# Patient Record
Sex: FEMALE | NOT HISPANIC OR LATINO | Employment: FULL TIME | URBAN - METROPOLITAN AREA
[De-identification: names, ages, dates, MRNs, and addresses within clinical notes are randomized per-mention and may not be internally consistent; named-entity substitution may affect disease eponyms.]

---

## 2019-09-17 ENCOUNTER — TRANSCRIBE ORDERS (OUTPATIENT)
Dept: LAB | Facility: HOSPITAL | Age: 44
End: 2019-09-17

## 2019-09-17 ENCOUNTER — LAB (OUTPATIENT)
Dept: LAB | Facility: HOSPITAL | Age: 44
End: 2019-09-17

## 2019-09-17 DIAGNOSIS — N92.0 MENORRHAGIA WITH REGULAR CYCLE: ICD-10-CM

## 2019-09-17 DIAGNOSIS — N92.0 MENORRHAGIA WITH REGULAR CYCLE: Primary | ICD-10-CM

## 2019-09-17 DIAGNOSIS — E06.3 HASHIMOTO'S DISEASE: ICD-10-CM

## 2019-09-17 LAB
DEPRECATED RDW RBC AUTO: 44 FL (ref 37–54)
ERYTHROCYTE [DISTWIDTH] IN BLOOD BY AUTOMATED COUNT: 13.2 % (ref 12.3–15.4)
FSH SERPL-ACNC: 5.93 MIU/ML
HCT VFR BLD AUTO: 38.9 % (ref 34–46.6)
HGB BLD-MCNC: 11.9 G/DL (ref 12–15.9)
MCH RBC QN AUTO: 27.8 PG (ref 26.6–33)
MCHC RBC AUTO-ENTMCNC: 30.6 G/DL (ref 31.5–35.7)
MCV RBC AUTO: 90.9 FL (ref 79–97)
PLATELET # BLD AUTO: 239 10*3/MM3 (ref 140–450)
PMV BLD AUTO: 11.5 FL (ref 6–12)
RBC # BLD AUTO: 4.28 10*6/MM3 (ref 3.77–5.28)
T3FREE SERPL-MCNC: 2.48 PG/ML (ref 2–4.4)
TSH SERPL DL<=0.05 MIU/L-ACNC: 1.98 UIU/ML (ref 0.27–4.2)
WBC NRBC COR # BLD: 8.7 10*3/MM3 (ref 3.4–10.8)

## 2019-09-17 PROCEDURE — 36415 COLL VENOUS BLD VENIPUNCTURE: CPT

## 2019-09-17 PROCEDURE — 83001 ASSAY OF GONADOTROPIN (FSH): CPT

## 2019-09-17 PROCEDURE — 84481 FREE ASSAY (FT-3): CPT

## 2019-09-17 PROCEDURE — 85027 COMPLETE CBC AUTOMATED: CPT

## 2019-09-17 PROCEDURE — 84443 ASSAY THYROID STIM HORMONE: CPT

## 2020-05-14 ENCOUNTER — TELEMEDICINE (OUTPATIENT)
Dept: ENDOCRINOLOGY | Facility: CLINIC | Age: 45
End: 2020-05-14

## 2020-05-14 VITALS — BODY MASS INDEX: 18.61 KG/M2 | WEIGHT: 105 LBS | HEIGHT: 63 IN

## 2020-05-14 DIAGNOSIS — R53.82 CHRONIC FATIGUE: ICD-10-CM

## 2020-05-14 DIAGNOSIS — E06.3 HYPOTHYROIDISM DUE TO HASHIMOTO'S THYROIDITIS: Primary | ICD-10-CM

## 2020-05-14 DIAGNOSIS — Z80.8 FAMILY HISTORY OF THYROID CANCER: ICD-10-CM

## 2020-05-14 DIAGNOSIS — E03.8 HYPOTHYROIDISM DUE TO HASHIMOTO'S THYROIDITIS: Primary | ICD-10-CM

## 2020-05-14 DIAGNOSIS — E04.0 SIMPLE GOITER: ICD-10-CM

## 2020-05-14 PROCEDURE — 99204 OFFICE O/P NEW MOD 45 MIN: CPT | Performed by: INTERNAL MEDICINE

## 2020-05-14 RX ORDER — EPINEPHRINE 0.3 MG/.3ML
INJECTION SUBCUTANEOUS
COMMUNITY
Start: 2019-07-30

## 2020-05-14 RX ORDER — BIOTIN 1 MG
1000 TABLET ORAL 3 TIMES DAILY
COMMUNITY

## 2020-05-14 RX ORDER — MONTELUKAST SODIUM 10 MG/1
10 TABLET ORAL NIGHTLY
COMMUNITY
Start: 2019-07-01

## 2020-05-14 RX ORDER — LEVOTHYROXINE SODIUM 0.05 MG/1
50 TABLET ORAL DAILY
COMMUNITY
Start: 2015-07-01 | End: 2020-05-14 | Stop reason: ALTCHOICE

## 2020-05-14 RX ORDER — CYANOCOBALAMIN (VITAMIN B-12) 1000 MCG
1 TABLET ORAL DAILY
COMMUNITY
Start: 2007-01-01

## 2020-05-14 RX ORDER — TIOTROPIUM BROMIDE INHALATION SPRAY 1.56 UG/1
2 SPRAY, METERED RESPIRATORY (INHALATION) DAILY
COMMUNITY
Start: 2020-02-25 | End: 2022-07-07

## 2020-05-14 RX ORDER — LEVALBUTEROL TARTRATE 45 UG/1
1-2 AEROSOL, METERED ORAL AS NEEDED
COMMUNITY
Start: 2020-03-20 | End: 2022-07-07

## 2020-05-14 RX ORDER — FEXOFENADINE HCL 180 MG/1
180 TABLET ORAL DAILY
COMMUNITY
Start: 2019-07-01

## 2020-05-14 RX ORDER — LEVOTHYROXINE SODIUM 0.12 MG/1
62.5 TABLET ORAL DAILY
Qty: 45 TABLET | Refills: 1 | Status: SHIPPED | OUTPATIENT
Start: 2020-05-14 | End: 2020-07-16 | Stop reason: SDUPTHER

## 2020-05-14 RX ORDER — FLUTICASONE PROPIONATE AND SALMETEROL 250; 50 UG/1; UG/1
1 POWDER RESPIRATORY (INHALATION) DAILY
COMMUNITY
Start: 2020-03-12 | End: 2022-07-07

## 2020-05-14 NOTE — PROGRESS NOTES
Chief Complaint   Patient presents with   • Hypothyroidism     new patient        Referring Provider  No ref. provider found     BREANN Perez is a 44 y.o. female had concerns including Hypothyroidism (new patient).     Visit was conducted via telemedicine. Consent was obtained from the patient to conduct a video visit.    She was diagnosed with hypothyroidism in 2016 and was started on levothyroxine at that time. Was told she had Hashimoto's years prior to that. She had some difficulty becoming pregnant with her second child and therefore levothyroxine was started.     She hasn't missed a dose of the levothyroxine but has at times been taking within 30 minutes of eating, rarely recently. Has been on 50 mcg for years. Most recent TSH from 2020 was 3.7.     Complains of some new fatigue and leg aching - bilateral quads.     Her grandmother had medullary thyroid cancer and pheochromocytoma. Her mother was also a physician.   Her mother was tested for RET oncogene and was reported negative about 20 years ago. Her mother is now 78 and hasn't been diagnosed with any other endocrine abnormalities other than MNG (no pheo, MTC, or hyperpara). No other family members have had MTC or pheo.      Her mother had a thyroid adenoma with subtotal thyroidectomy years ago. Has aunt's with graves and other autoimmune conditions.    Pt was told she may have had a low cortisol level. She hasn't had a cosyntropin stim test. Denies orthostatic symptoms or weight loss. CMP was recently normal.       Past Medical History:   Diagnosis Date   • Allergies    • Asthma    • Goiter    • Hashimoto's disease    • Hypothyroidism    • Vitamin B12 deficiency    • Vitamin D deficiency      Past Surgical History:   Procedure Laterality Date   •  SECTION      x2   • ENDOSCOPY     • TENDON RELEASE Bilateral       Family History   Problem Relation Age of Onset   • Atrial fibrillation Mother    • Hypertension Mother    • Thyroid disease  Mother    • Thyroid disease Father    • Hypertension Father    • Diabetes Father    • Hyperlipidemia Father    • Thyroid disease Maternal Aunt    • Thyroid cancer Maternal Grandmother       Social History     Socioeconomic History   • Marital status:      Spouse name: Not on file   • Number of children: Not on file   • Years of education: Not on file   • Highest education level: Not on file   Tobacco Use   • Smoking status: Never Smoker   • Smokeless tobacco: Never Used   Substance and Sexual Activity   • Alcohol use: Yes     Comment: rare   • Drug use: Never   • Sexual activity: Defer      Allergies   Allergen Reactions   • Latex Cough and Rash      Current Outpatient Medications on File Prior to Visit   Medication Sig Dispense Refill   • Biotin 1000 MCG tablet Take 1,000 mcg by mouth 3 (Three) Times a Day.     • Cholecalciferol 10 MCG (400 UNIT) chewable tablet Chew 2,000 Units Daily.     • Cyanocobalamin (B-12) 1000 MCG tablet      • EPINEPHrine (EPIPEN) 0.3 MG/0.3ML solution auto-injector injection USE FOR SEVERE ALLERGIC REACTION     • fexofenadine (Allegra Allergy) 180 MG tablet      • fluticasone-salmeterol (ADVAIR) 250-50 MCG/DOSE DISKUS 1 inhalation every 12 hours. Do NOT use more than twice a day. Rinse mouth after use.     • levalbuterol (XOPENEX HFA) 45 MCG/ACT inhaler INHALE 2 PUFFS BY MOUTH INTO THE LUNGS EVERY 4 (FOUR) HOURS AS NEEDED FOR WHEEZING     • montelukast (Singulair) 10 MG tablet      • MULTIPLE VITAMIN PO Take  by mouth Daily.     • Probiotic Product (PROBIOTIC-10 PO) Take  by mouth.     • SPIRIVA RESPIMAT 1.25 MCG/ACT aerosol solution inhaler Inhale 2 puffs Daily.     • WIXELA INHUB 100-50 MCG/DOSE DISKUS INHALE 1 PUFF BY MOUTH EVERY 12 HOURS     • [DISCONTINUED] levothyroxine (SYNTHROID, LEVOTHROID) 50 MCG tablet Take 50 mcg by mouth Daily.       No current facility-administered medications on file prior to visit.         Review of Systems   Constitutional: Positive for fatigue.  "Negative for unexpected weight loss.   HENT: Negative.    Eyes: Negative.    Respiratory: Negative.    Cardiovascular: Negative.    Gastrointestinal: Negative.    Endocrine: Negative.    Genitourinary: Negative.    Musculoskeletal: Positive for myalgias.   Skin: Negative.    Allergic/Immunologic: Negative.    Neurological: Negative.    Hematological: Negative.    Psychiatric/Behavioral: Negative.       ROS was reviewed and verified. All other ROS negative.    Ht 160 cm (63\")   Wt 47.6 kg (105 lb)   LMP 05/02/2020 (Exact Date)   Breastfeeding No   BMI 18.60 kg/m²      Constitutional:  no acute distress   ENT/Thyroid: Not examined    Eyes: Not examined    Respiratory:  No conversational dyspnea    Cardiovascular:  Not performed.   Chest:  Not performed.   Abdomen: Not performed.   : Not performed.   Musculoskeletal: Not examined   Skin: not performed.   Neuro: mental status, speech normal, alert and oriented x3   Psych: oriented to time, place and person, mood and affect are within normal limits     Labs/Imaging    CBC w/DIFF   Lab Results   Component Value Date    WBC 8.70 09/17/2019    RBC 4.28 09/17/2019    HGB 11.9 (L) 09/17/2019    HCT 38.9 09/17/2019    MCV 90.9 09/17/2019    MCH 27.8 09/17/2019    MCHC 30.6 (L) 09/17/2019    RDW 13.2 09/17/2019    RDWSD 44.0 09/17/2019    MPV 11.5 09/17/2019     09/17/2019       TSH  Lab Results   Component Value Date    TSH 1.980 09/17/2019       T3  Lab Results   Component Value Date    T3FREE 2.48 09/17/2019       Assessment and Plan    Kendra was seen today for hypothyroidism.    Diagnoses and all orders for this visit:    Hypothyroidism due to Hashimoto's thyroiditis  Clinically euthyroid though pt complains of new fatigue and TSH has trended up to 3.7.   Increase dose of levothyroxine to 62.5 mcg daily and recheck TFTs in 6 weeks. Titrate for TSH in the mid to lower range of normal or optimal level at which patient feels well within a normal range.   -     " T4, Free; Future  -     TSH; Future  -     levothyroxine (Synthroid) 125 MCG tablet; Take 0.5 tablets by mouth Daily. On empty stomach, 30-60 min prior to other food/med/drink    Simple goiter  Last US 1/10/19 and pt has a family history of MTC. US was consistent with heterogeneous goiter with no nodules.  Update US at follow-up in August.   Pt would like to have US monitoring Q2 years or so due to family history.     Chronic fatigue  Low suspicion for adrenal insufficiency though pt reports history of low cortisol level.   Will screen with fasting 8 AM ACTH and cortisol. If low, cosyntropin stim test will be done.   -     ACTH; Future  -     Cortisol - AM; Future    Family history of thyroid cancer  MGM had MTC and pheo. Her mother was tested for the RET oncogene ~20 years ago which was reportedly negative. She has no other family members with MTC, pheo, hyperparathyroidism. No confirmed MEN in the family though seems likely that her grandmother had it. No genetic testing is indicated as her mother tested negative and has none of the MEN syndromes.        Return in about 3 months (around 8/14/2020) for next scheduled follow up with thyroid ultrasound. The patient was instructed to contact the clinic with any interval questions or concerns.    Sandra Gallegos, DO   Endocrinologist    Please note that portions of this document were completed with a voice recognition program. Efforts were made to edit the dictations, but occasionally words are mis-transcribed.

## 2020-07-06 ENCOUNTER — LAB (OUTPATIENT)
Dept: LAB | Facility: HOSPITAL | Age: 45
End: 2020-07-06

## 2020-07-06 DIAGNOSIS — R53.82 CHRONIC FATIGUE: ICD-10-CM

## 2020-07-06 DIAGNOSIS — E03.8 HYPOTHYROIDISM DUE TO HASHIMOTO'S THYROIDITIS: ICD-10-CM

## 2020-07-06 DIAGNOSIS — E06.3 HYPOTHYROIDISM DUE TO HASHIMOTO'S THYROIDITIS: ICD-10-CM

## 2020-07-06 LAB
CORTIS AM PEAK SERPL-MCNC: 11.92 MCG/DL
T4 FREE SERPL-MCNC: 1.4 NG/DL (ref 0.93–1.7)
TSH SERPL DL<=0.05 MIU/L-ACNC: 2.44 UIU/ML (ref 0.27–4.2)

## 2020-07-06 PROCEDURE — 84443 ASSAY THYROID STIM HORMONE: CPT | Performed by: INTERNAL MEDICINE

## 2020-07-06 PROCEDURE — 82024 ASSAY OF ACTH: CPT | Performed by: INTERNAL MEDICINE

## 2020-07-06 PROCEDURE — 84439 ASSAY OF FREE THYROXINE: CPT | Performed by: INTERNAL MEDICINE

## 2020-07-06 PROCEDURE — 82533 TOTAL CORTISOL: CPT | Performed by: INTERNAL MEDICINE

## 2020-07-07 LAB — ACTH PLAS-MCNC: 14.7 PG/ML (ref 7.2–63.3)

## 2020-07-16 ENCOUNTER — OFFICE VISIT (OUTPATIENT)
Dept: ENDOCRINOLOGY | Facility: CLINIC | Age: 45
End: 2020-07-16

## 2020-07-16 VITALS
BODY MASS INDEX: 18.8 KG/M2 | WEIGHT: 106.1 LBS | HEIGHT: 63 IN | OXYGEN SATURATION: 98 % | SYSTOLIC BLOOD PRESSURE: 100 MMHG | HEART RATE: 77 BPM | DIASTOLIC BLOOD PRESSURE: 60 MMHG

## 2020-07-16 DIAGNOSIS — E06.3 HYPOTHYROIDISM DUE TO HASHIMOTO'S THYROIDITIS: ICD-10-CM

## 2020-07-16 DIAGNOSIS — E04.9 GOITER: Primary | ICD-10-CM

## 2020-07-16 DIAGNOSIS — E03.8 HYPOTHYROIDISM DUE TO HASHIMOTO'S THYROIDITIS: ICD-10-CM

## 2020-07-16 PROBLEM — J45.909 ASTHMA: Status: ACTIVE | Noted: 2020-07-16

## 2020-07-16 PROCEDURE — 76536 US EXAM OF HEAD AND NECK: CPT | Performed by: INTERNAL MEDICINE

## 2020-07-16 PROCEDURE — 99214 OFFICE O/P EST MOD 30 MIN: CPT | Performed by: INTERNAL MEDICINE

## 2020-07-16 RX ORDER — LEVOTHYROXINE SODIUM 137 UG/1
68.5 TABLET ORAL DAILY
Qty: 45 TABLET | Refills: 1 | Status: SHIPPED | OUTPATIENT
Start: 2020-07-16 | End: 2020-09-03 | Stop reason: SDUPTHER

## 2020-07-16 NOTE — PROGRESS NOTES
"Chief Complaint   Patient presents with   • Thyroid Nodule     Follow Up   • Hypothyroidism        HPI   Kendra Perez is a 44 y.o. female had concerns including Thyroid Nodule (Follow Up) and Hypothyroidism.      Patient is here for follow-up on hypothyroidism.  I increased her dose of levothyroxine from 50 mcg daily to half of the 125 mcg tablet daily.  Since this dose change she is feeling significantly improved, about 80% back to baseline.  Still has minor joint pains but those have nearly resolved. Fatigue is much improved.     TSH improved from 3.7 to 2.44 with a free T4 of 1.4.    She has a family history (grandmother) of MEN syndrome with medullary thyroid cancer and pheo has wanted to have routine thyroid ultrasounds.  Last ultrasound was January 2019.    The following portions of the patient's history were reviewed and updated as appropriate: allergies, current medications, past family history, past medical history, past social history, past surgical history and problem list.    Review of Systems   Constitutional: Negative.    HENT: Negative.    Eyes: Negative.    Respiratory: Negative.    Cardiovascular: Negative.    Gastrointestinal: Negative.    Endocrine: Negative.    Genitourinary: Negative.    Musculoskeletal: Positive for arthralgias.   Neurological: Negative.       ROS was reviewed and verified. All other ROS negative.    /60   Pulse 77   Ht 160 cm (63\")   Wt 48.1 kg (106 lb 1.6 oz)   SpO2 98%   BMI 18.79 kg/m²      Physical Exam     Constitutional:  well developed; well nourished  no acute distress   ENT/Thyroid: no thyromegaly  no palpable nodules   Eyes: EOM intact  Conjunctiva: clear   Respiratory:  breathing is unlabored  clear to auscultation bilaterally   Cardiovascular:  regular rate and rhythm, S1, S2 normal, no murmur, click, rub or gallop   Chest:  Not performed.   Abdomen: Not performed.   : Not performed.   Musculoskeletal: negative findings:  ROM of all joints is " normal, no deformities present   Skin: dry and warm   Neuro: normal without focal findings and mental status, speech normal, alert and oriented x3   Psych: oriented to time, place and person, mood and affect are within normal limits     CBC w/DIFF   Lab Results   Component Value Date    WBC 8.70 09/17/2019    RBC 4.28 09/17/2019    HGB 11.9 (L) 09/17/2019    HCT 38.9 09/17/2019    MCV 90.9 09/17/2019    MCH 27.8 09/17/2019    MCHC 30.6 (L) 09/17/2019    RDW 13.2 09/17/2019    RDWSD 44.0 09/17/2019    MPV 11.5 09/17/2019     09/17/2019     TSH  Lab Results   Component Value Date    TSH 2.440 07/06/2020    TSH 1.980 09/17/2019       T4  Lab Results   Component Value Date    FREET4 1.40 07/06/2020     T3  Lab Results   Component Value Date    T3FREE 2.48 09/17/2019     Lab Results   Component Value Date    LABCORTAM 11.92 07/06/2020     Lab Results   Component Value Date    ACTH 14.7 07/06/2020     Thyroid Ultrasound 07/16/20    Indication: History of goiter, Hashimoto's  Comparison Imaging: Ultrasound report January 2019  Clinical History:  Family history of medullary thyroid cancer and pheochromocytoma (MEN syndrome), patient has Hashimoto's and has had a heterogeneous gland/goiter in the past    Real time high resolution imaging of the thyroid gland was performed in transverse and longitudinal planes. Previous imaging reports were reviewed if available and compared to the current to assess stability.     Lobes:  The right lobe measured 4.2 cm L x 1.2 cm AP x 1.5 cm in TV dimension.    The isthmus measured 0.24 cm in thickness.    The left thyroid lobe measured 4.2 cm L x 1.0 cm AP x 1.3 cm in TV dimension.    Thyroid gland is heterogeneous and contains no nodules.    No pathologic lymph nodes were seen.    Impression:  Normal-sized thyroid gland with a heterogeneous appearance, consistent with known autoimmune thyroid disease.  No thyroid nodules.    Recommendation:  Repeat ultrasound in 18 months due to  family history of medullary thyroid cancer.        Assessment and Plan    Kendra was seen today for thyroid nodule and hypothyroidism.    Diagnoses and all orders for this visit:    Goiter  History of heterogeneous thyroid/goiter.   Thyroid essentially normal in size at this time.  See ultrasound report as above.  There are no distinct nodules though the gland is heterogeneous in appearance, consistent with known Hashimoto's disease.  Repeat ultrasound in 18 months (3/2022) due to family history of medullary thyroid cancer.  -     US Thyroid    Hypothyroidism due to Hashimoto's thyroiditis  Clinically euthyroid with an improved TSH after dose increase to 62.5 mcg daily.  She feels better but is not quite back to baseline.  Increase dose of levothyroxine to half a tablet of 137 mcg daily and recheck TFTs in 6 weeks.  -     levothyroxine (SYNTHROID, LEVOTHROID) 137 MCG tablet; Take 0.5 tablets by mouth Daily. On empty stomach, 30-60 min prior to other food/med/drink  -     T4, Free; Future  -     TSH; Future         Return in about 6 months (around 1/16/2021) for next scheduled follow up. The patient was instructed to contact the clinic with any interval questions or concerns.    Sandra Gallegos, DO   Endocrinologist    Please note that portions of this document were completed with a voice recognition program. Efforts were made to edit the dictations, but occasionally words are mis-transcribed.

## 2020-09-01 ENCOUNTER — LAB (OUTPATIENT)
Dept: LAB | Facility: HOSPITAL | Age: 45
End: 2020-09-01

## 2020-09-01 DIAGNOSIS — E06.3 HYPOTHYROIDISM DUE TO HASHIMOTO'S THYROIDITIS: ICD-10-CM

## 2020-09-01 DIAGNOSIS — E03.8 HYPOTHYROIDISM DUE TO HASHIMOTO'S THYROIDITIS: ICD-10-CM

## 2020-09-01 LAB
T4 FREE SERPL-MCNC: 1.51 NG/DL (ref 0.93–1.7)
TSH SERPL DL<=0.05 MIU/L-ACNC: 1.46 UIU/ML (ref 0.27–4.2)

## 2020-09-01 PROCEDURE — 84443 ASSAY THYROID STIM HORMONE: CPT | Performed by: INTERNAL MEDICINE

## 2020-09-01 PROCEDURE — 84439 ASSAY OF FREE THYROXINE: CPT | Performed by: INTERNAL MEDICINE

## 2020-09-03 DIAGNOSIS — E06.3 HYPOTHYROIDISM DUE TO HASHIMOTO'S THYROIDITIS: ICD-10-CM

## 2020-09-03 DIAGNOSIS — E03.8 HYPOTHYROIDISM DUE TO HASHIMOTO'S THYROIDITIS: ICD-10-CM

## 2020-09-03 RX ORDER — LEVOTHYROXINE SODIUM 0.07 MG/1
TABLET ORAL
Qty: 40 TABLET | Refills: 1 | Status: SHIPPED | OUTPATIENT
Start: 2020-09-03 | End: 2020-10-23 | Stop reason: SDUPTHER

## 2020-09-03 RX ORDER — LEVOTHYROXINE SODIUM 137 UG/1
TABLET ORAL
Qty: 30 TABLET | Refills: 1 | Status: SHIPPED | OUTPATIENT
Start: 2020-09-03 | End: 2020-10-23 | Stop reason: SDUPTHER

## 2020-10-20 ENCOUNTER — LAB (OUTPATIENT)
Dept: LAB | Facility: HOSPITAL | Age: 45
End: 2020-10-20

## 2020-10-20 DIAGNOSIS — E03.8 HYPOTHYROIDISM DUE TO HASHIMOTO'S THYROIDITIS: ICD-10-CM

## 2020-10-20 DIAGNOSIS — E06.3 HYPOTHYROIDISM DUE TO HASHIMOTO'S THYROIDITIS: ICD-10-CM

## 2020-10-20 PROCEDURE — 84443 ASSAY THYROID STIM HORMONE: CPT | Performed by: INTERNAL MEDICINE

## 2020-10-20 PROCEDURE — 84439 ASSAY OF FREE THYROXINE: CPT | Performed by: INTERNAL MEDICINE

## 2020-10-21 LAB
T4 FREE SERPL-MCNC: 1.37 NG/DL (ref 0.93–1.7)
TSH SERPL DL<=0.05 MIU/L-ACNC: 2.31 UIU/ML (ref 0.27–4.2)

## 2020-10-23 DIAGNOSIS — E06.3 HYPOTHYROIDISM DUE TO HASHIMOTO'S THYROIDITIS: ICD-10-CM

## 2020-10-23 DIAGNOSIS — E03.8 HYPOTHYROIDISM DUE TO HASHIMOTO'S THYROIDITIS: ICD-10-CM

## 2020-10-23 RX ORDER — LEVOTHYROXINE SODIUM 137 UG/1
TABLET ORAL
Qty: 38 TABLET | Refills: 1 | Status: SHIPPED | OUTPATIENT
Start: 2020-10-23 | End: 2021-03-16 | Stop reason: SDUPTHER

## 2020-10-23 RX ORDER — LEVOTHYROXINE SODIUM 0.07 MG/1
TABLET ORAL
Qty: 30 TABLET | Refills: 1 | Status: SHIPPED | OUTPATIENT
Start: 2020-10-23 | End: 2021-03-16 | Stop reason: SDUPTHER

## 2020-12-15 ENCOUNTER — LAB (OUTPATIENT)
Dept: LAB | Facility: HOSPITAL | Age: 45
End: 2020-12-15

## 2020-12-15 DIAGNOSIS — E06.3 HYPOTHYROIDISM DUE TO HASHIMOTO'S THYROIDITIS: ICD-10-CM

## 2020-12-15 DIAGNOSIS — E03.8 HYPOTHYROIDISM DUE TO HASHIMOTO'S THYROIDITIS: ICD-10-CM

## 2020-12-15 LAB
T4 FREE SERPL-MCNC: 1.41 NG/DL (ref 0.93–1.7)
TSH SERPL DL<=0.05 MIU/L-ACNC: 2.89 UIU/ML (ref 0.27–4.2)

## 2020-12-15 PROCEDURE — 84443 ASSAY THYROID STIM HORMONE: CPT

## 2020-12-15 PROCEDURE — 84439 ASSAY OF FREE THYROXINE: CPT

## 2020-12-18 DIAGNOSIS — E03.8 HYPOTHYROIDISM DUE TO HASHIMOTO'S THYROIDITIS: Primary | ICD-10-CM

## 2020-12-18 DIAGNOSIS — E06.3 HYPOTHYROIDISM DUE TO HASHIMOTO'S THYROIDITIS: Primary | ICD-10-CM

## 2021-03-15 ENCOUNTER — LAB (OUTPATIENT)
Dept: LAB | Facility: HOSPITAL | Age: 46
End: 2021-03-15

## 2021-03-15 DIAGNOSIS — E06.3 HYPOTHYROIDISM DUE TO HASHIMOTO'S THYROIDITIS: ICD-10-CM

## 2021-03-15 DIAGNOSIS — E03.8 HYPOTHYROIDISM DUE TO HASHIMOTO'S THYROIDITIS: ICD-10-CM

## 2021-03-15 PROCEDURE — 84439 ASSAY OF FREE THYROXINE: CPT

## 2021-03-15 PROCEDURE — 84443 ASSAY THYROID STIM HORMONE: CPT

## 2021-03-16 DIAGNOSIS — E03.8 HYPOTHYROIDISM DUE TO HASHIMOTO'S THYROIDITIS: ICD-10-CM

## 2021-03-16 DIAGNOSIS — E06.3 HYPOTHYROIDISM DUE TO HASHIMOTO'S THYROIDITIS: ICD-10-CM

## 2021-03-16 LAB
T4 FREE SERPL-MCNC: 1.3 NG/DL (ref 0.93–1.7)
TSH SERPL DL<=0.05 MIU/L-ACNC: 1.68 UIU/ML (ref 0.27–4.2)

## 2021-03-16 RX ORDER — LEVOTHYROXINE SODIUM 0.07 MG/1
TABLET ORAL
Qty: 40 TABLET | Refills: 1 | Status: SHIPPED | OUTPATIENT
Start: 2021-03-16 | End: 2021-08-05 | Stop reason: SDUPTHER

## 2021-03-16 RX ORDER — LEVOTHYROXINE SODIUM 137 UG/1
TABLET ORAL
Qty: 30 TABLET | Refills: 1 | Status: SHIPPED | OUTPATIENT
Start: 2021-03-16 | End: 2021-08-05

## 2021-05-14 ENCOUNTER — TELEPHONE (OUTPATIENT)
Dept: ENDOCRINOLOGY | Facility: CLINIC | Age: 46
End: 2021-05-14

## 2021-05-18 DIAGNOSIS — E06.3 HYPOTHYROIDISM DUE TO HASHIMOTO'S THYROIDITIS: Primary | ICD-10-CM

## 2021-05-18 DIAGNOSIS — E03.8 HYPOTHYROIDISM DUE TO HASHIMOTO'S THYROIDITIS: Primary | ICD-10-CM

## 2021-06-04 DIAGNOSIS — E03.8 HYPOTHYROIDISM DUE TO HASHIMOTO'S THYROIDITIS: Primary | ICD-10-CM

## 2021-06-04 DIAGNOSIS — E06.3 HYPOTHYROIDISM DUE TO HASHIMOTO'S THYROIDITIS: Primary | ICD-10-CM

## 2021-07-19 ENCOUNTER — LAB (OUTPATIENT)
Dept: LAB | Facility: HOSPITAL | Age: 46
End: 2021-07-19

## 2021-07-19 DIAGNOSIS — E03.8 HYPOTHYROIDISM DUE TO HASHIMOTO'S THYROIDITIS: ICD-10-CM

## 2021-07-19 DIAGNOSIS — E06.3 HYPOTHYROIDISM DUE TO HASHIMOTO'S THYROIDITIS: ICD-10-CM

## 2021-07-19 LAB
25(OH)D3 SERPL-MCNC: 64.1 NG/ML
DEPRECATED RDW RBC AUTO: 38 FL (ref 37–54)
ERYTHROCYTE [DISTWIDTH] IN BLOOD BY AUTOMATED COUNT: 11.9 % (ref 12.3–15.4)
HCT VFR BLD AUTO: 35.7 % (ref 34–46.6)
HGB BLD-MCNC: 11.8 G/DL (ref 12–15.9)
MCH RBC QN AUTO: 29.1 PG (ref 26.6–33)
MCHC RBC AUTO-ENTMCNC: 33.1 G/DL (ref 31.5–35.7)
MCV RBC AUTO: 88.1 FL (ref 79–97)
PLATELET # BLD AUTO: 296 10*3/MM3 (ref 140–450)
PMV BLD AUTO: 10.1 FL (ref 6–12)
RBC # BLD AUTO: 4.05 10*6/MM3 (ref 3.77–5.28)
VIT B12 BLD-MCNC: 924 PG/ML (ref 211–946)
WBC # BLD AUTO: 8.38 10*3/MM3 (ref 3.4–10.8)

## 2021-07-19 PROCEDURE — 84466 ASSAY OF TRANSFERRIN: CPT

## 2021-07-19 PROCEDURE — 82607 VITAMIN B-12: CPT

## 2021-07-19 PROCEDURE — 85027 COMPLETE CBC AUTOMATED: CPT

## 2021-07-19 PROCEDURE — 84439 ASSAY OF FREE THYROXINE: CPT

## 2021-07-19 PROCEDURE — 83540 ASSAY OF IRON: CPT

## 2021-07-19 PROCEDURE — 84443 ASSAY THYROID STIM HORMONE: CPT

## 2021-07-19 PROCEDURE — 80053 COMPREHEN METABOLIC PANEL: CPT

## 2021-07-19 PROCEDURE — 82306 VITAMIN D 25 HYDROXY: CPT

## 2021-07-20 LAB
ALBUMIN SERPL-MCNC: 4.3 G/DL (ref 3.5–5.2)
ALBUMIN/GLOB SERPL: 1.7 G/DL
ALP SERPL-CCNC: 41 U/L (ref 39–117)
ALT SERPL W P-5'-P-CCNC: 19 U/L (ref 1–33)
ANION GAP SERPL CALCULATED.3IONS-SCNC: 13.1 MMOL/L (ref 5–15)
AST SERPL-CCNC: 15 U/L (ref 1–32)
BILIRUB SERPL-MCNC: 0.2 MG/DL (ref 0–1.2)
BUN SERPL-MCNC: 16 MG/DL (ref 6–20)
BUN/CREAT SERPL: 23.9 (ref 7–25)
CALCIUM SPEC-SCNC: 9 MG/DL (ref 8.6–10.5)
CHLORIDE SERPL-SCNC: 101 MMOL/L (ref 98–107)
CO2 SERPL-SCNC: 23.9 MMOL/L (ref 22–29)
CREAT SERPL-MCNC: 0.67 MG/DL (ref 0.57–1)
GFR SERPL CREATININE-BSD FRML MDRD: 116 ML/MIN/1.73
GFR SERPL CREATININE-BSD FRML MDRD: 96 ML/MIN/1.73
GLOBULIN UR ELPH-MCNC: 2.6 GM/DL
GLUCOSE SERPL-MCNC: 83 MG/DL (ref 65–99)
IRON 24H UR-MRATE: 86 MCG/DL (ref 37–145)
IRON SATN MFR SERPL: 22 % (ref 20–50)
POTASSIUM SERPL-SCNC: 4.1 MMOL/L (ref 3.5–5.2)
PROT SERPL-MCNC: 6.9 G/DL (ref 6–8.5)
SODIUM SERPL-SCNC: 138 MMOL/L (ref 136–145)
T4 FREE SERPL-MCNC: 1.4 NG/DL (ref 0.93–1.7)
TIBC SERPL-MCNC: 393 MCG/DL (ref 298–536)
TRANSFERRIN SERPL-MCNC: 264 MG/DL (ref 200–360)
TSH SERPL DL<=0.05 MIU/L-ACNC: 2.4 UIU/ML (ref 0.27–4.2)

## 2021-08-05 ENCOUNTER — OFFICE VISIT (OUTPATIENT)
Dept: ENDOCRINOLOGY | Facility: CLINIC | Age: 46
End: 2021-08-05

## 2021-08-05 VITALS
OXYGEN SATURATION: 97 % | DIASTOLIC BLOOD PRESSURE: 68 MMHG | SYSTOLIC BLOOD PRESSURE: 112 MMHG | HEART RATE: 88 BPM | HEIGHT: 63 IN | WEIGHT: 109.2 LBS | BODY MASS INDEX: 19.35 KG/M2

## 2021-08-05 DIAGNOSIS — E03.8 HYPOTHYROIDISM DUE TO HASHIMOTO'S THYROIDITIS: Primary | ICD-10-CM

## 2021-08-05 DIAGNOSIS — E06.3 HYPOTHYROIDISM DUE TO HASHIMOTO'S THYROIDITIS: Primary | ICD-10-CM

## 2021-08-05 DIAGNOSIS — E04.1 THYROID NODULE: ICD-10-CM

## 2021-08-05 PROCEDURE — 76536 US EXAM OF HEAD AND NECK: CPT | Performed by: INTERNAL MEDICINE

## 2021-08-05 PROCEDURE — 99214 OFFICE O/P EST MOD 30 MIN: CPT | Performed by: INTERNAL MEDICINE

## 2021-08-05 RX ORDER — LEVOTHYROXINE SODIUM 0.07 MG/1
75 TABLET ORAL DAILY
Qty: 90 TABLET | Refills: 3 | Status: SHIPPED | OUTPATIENT
Start: 2021-08-05 | End: 2021-10-26 | Stop reason: SDUPTHER

## 2021-08-05 NOTE — PROGRESS NOTES
"Chief Complaint   Patient presents with   • Goiter        HPI   Kendra Perez is a 45 y.o. female had concerns including Goiter.      Currently on levothyroxine half a tab of 137 mcg 4 days/week and 75 mcg 3 days/week.    Having issues with fatigue, early morning awakenings and is up for the day.  Still some nonspecific joint pains as well.    Is due for repeat thyroid ultrasound.    The following portions of the patient's history were reviewed and updated as appropriate: allergies, current medications, past family history, past medical history, past social history, past surgical history and problem list.    Review of Systems   Constitutional: Positive for fatigue.   Musculoskeletal: Positive for arthralgias.   Psychiatric/Behavioral: Positive for sleep disturbance.        /68   Pulse 88   Ht 160 cm (63\")   Wt 49.5 kg (109 lb 3.2 oz)   SpO2 97%   BMI 19.34 kg/m²      Physical Exam  Vitals reviewed.   Constitutional:       Appearance: Normal appearance.   Neck:      Thyroid: No thyroid mass, thyromegaly or thyroid tenderness.   Cardiovascular:      Rate and Rhythm: Normal rate.   Pulmonary:      Effort: Pulmonary effort is normal.   Neurological:      General: No focal deficit present.      Mental Status: She is alert. Mental status is at baseline.   Psychiatric:         Mood and Affect: Mood normal.         Behavior: Behavior normal.          CMP  Lab Results   Component Value Date    GLUCOSE 83 07/19/2021    BUN 16 07/19/2021    CREATININE 0.67 07/19/2021    EGFRIFNONA 96 07/19/2021    EGFRIFAFRI 116 07/19/2021    BCR 23.9 07/19/2021    K 4.1 07/19/2021    CO2 23.9 07/19/2021    CALCIUM 9.0 07/19/2021    ALBUMIN 4.30 07/19/2021    AST 15 07/19/2021    ALT 19 07/19/2021        CBC w/DIFF   Lab Results   Component Value Date    WBC 8.38 07/19/2021    RBC 4.05 07/19/2021    HGB 11.8 (L) 07/19/2021    HCT 35.7 07/19/2021    MCV 88.1 07/19/2021    MCH 29.1 07/19/2021    MCHC 33.1 07/19/2021    RDW 11.9 (L) " 07/19/2021    RDWSD 38.0 07/19/2021    MPV 10.1 07/19/2021     07/19/2021       TSH  Lab Results   Component Value Date    TSH 2.400 07/19/2021    TSH 1.680 03/15/2021    TSH 2.890 12/15/2020       T4  Lab Results   Component Value Date    FREET4 1.40 07/19/2021    FREET4 1.30 03/15/2021    FREET4 1.41 12/15/2020     T3  Lab Results   Component Value Date    T3FREE 2.48 09/17/2019     Lab Results   Component Value Date    WWTA67ZK 64.1 07/19/2021    IRON 86 07/19/2021    LABIRON 22 07/19/2021    IAMRHOVV01 924 07/19/2021         Thyroid Ultrasound 08/05/21    Indication: hypothyroidism  Comparison Imaging: US 7/2020  Clinical History: Family history of medullary thyroid cancer and pheochromocytoma (MEN syndrome), patient has Hashimoto's and has had a heterogeneous gland/goiter in the past    Real time high resolution imaging of the thyroid gland was performed in transverse and longitudinal planes. Previous imaging reports were reviewed if available and compared to the current to assess stability.     Lobes:  The right lobe measured 4.6 cm L x 1.2 cm AP x 1.4 cm in TV dimension.    The isthmus measured 0.2 cm in thickness.    The left thyroid lobe measured 4.8 cm L x 1.1 cm AP x 1.0 cm in TV dimension.    Thyroid gland is heterogeneous and contains a single cysting nodule in the right lobe.    Nodule 1   located in the right mid lobe and measures 0.6 x 0.5 x 0.4 cm (L X AP X TV).  This nodule is cystic, homogeneous, hypoechoic with well-defined margin, It has no artifacts.      No pathologic lymph nodes were seen.    Impression:  New right lobe 6 mm cyst.  Mildly heterogeneous gland with no other nodules.    Recommendation:  Repeat ultrasound in 1 year.      Assessment and Plan    Diagnoses and all orders for this visit:    1. Hypothyroidism due to Hashimoto's thyroiditis (Primary)  TSH has trended up to 2.4 x 1.68.  Patient complaining of joint pain, difficulty sleeping, fatigue.  Increase levothyroxine to 75  mcg daily (from half a tab at 137 4 days/week and 75 mcg 3 days/week).  Recheck TFTs in 6 weeks.  -     TSH, future  -     Free T4, future  -     levothyroxine (Synthroid) 75 MCG tablet; Take 1 tablet by mouth Daily.  Dispense: 90 tablet; Refill: 3    2. Thyroid nodule  See detailed ultrasound report as above.  New noted right lobe 6 mm cyst.  Otherwise mildly heterogeneous gland with no other nodules.  Patient has a family history of MEN syndrome (medullary thyroid cancer, pheochromocytoma).  Will screen yearly with ultrasound.  Repeat next ultrasound in 1 year.  -     US Thyroid         Return in about 6 months (around 2/5/2022) for next scheduled follow up. The patient was instructed to contact the clinic with any interval questions or concerns.    Sandra Gallegos, DO   Endocrinologist    Please note that portions of this document were completed with a voice recognition program. Efforts were made to edit the dictations, but occasionally words are mis-transcribed.    Answers for HPI/ROS submitted by the patient on 8/3/2021  Please describe your symptoms.: f/u U/s and yearly. some fatigue, difficulty sleeping  Have you had these symptoms before?: Yes  How long have you been having these symptoms?: Greater than 2 weeks  Please list any medications you are currently taking for this condition.: levothyroxine  What is the primary reason for your visit?: Other

## 2021-10-13 ENCOUNTER — LAB (OUTPATIENT)
Dept: LAB | Facility: HOSPITAL | Age: 46
End: 2021-10-13

## 2021-10-13 DIAGNOSIS — E06.3 HYPOTHYROIDISM DUE TO HASHIMOTO'S THYROIDITIS: ICD-10-CM

## 2021-10-13 DIAGNOSIS — E03.8 HYPOTHYROIDISM DUE TO HASHIMOTO'S THYROIDITIS: ICD-10-CM

## 2021-10-13 LAB
T4 FREE SERPL-MCNC: 1.61 NG/DL (ref 0.93–1.7)
TSH SERPL DL<=0.05 MIU/L-ACNC: 0.92 UIU/ML (ref 0.27–4.2)

## 2021-10-13 PROCEDURE — 84443 ASSAY THYROID STIM HORMONE: CPT

## 2021-10-13 PROCEDURE — 84439 ASSAY OF FREE THYROXINE: CPT

## 2021-10-26 DIAGNOSIS — E03.8 HYPOTHYROIDISM DUE TO HASHIMOTO'S THYROIDITIS: ICD-10-CM

## 2021-10-26 DIAGNOSIS — E06.3 HYPOTHYROIDISM DUE TO HASHIMOTO'S THYROIDITIS: ICD-10-CM

## 2021-10-26 RX ORDER — LEVOTHYROXINE SODIUM 0.07 MG/1
TABLET ORAL
Qty: 80 TABLET | Refills: 3 | Status: SHIPPED | OUTPATIENT
Start: 2021-10-26 | End: 2022-01-12 | Stop reason: SDUPTHER

## 2021-10-26 RX ORDER — LEVOTHYROXINE SODIUM 137 UG/1
TABLET ORAL
Qty: 30 TABLET | Refills: 1 | Status: SHIPPED | OUTPATIENT
Start: 2021-10-26 | End: 2022-01-12 | Stop reason: SDUPTHER

## 2021-11-30 ENCOUNTER — LAB (OUTPATIENT)
Dept: LAB | Facility: HOSPITAL | Age: 46
End: 2021-11-30

## 2021-11-30 DIAGNOSIS — E03.8 HYPOTHYROIDISM DUE TO HASHIMOTO'S THYROIDITIS: ICD-10-CM

## 2021-11-30 DIAGNOSIS — E06.3 HYPOTHYROIDISM DUE TO HASHIMOTO'S THYROIDITIS: ICD-10-CM

## 2021-11-30 PROCEDURE — 84443 ASSAY THYROID STIM HORMONE: CPT

## 2021-11-30 PROCEDURE — 84439 ASSAY OF FREE THYROXINE: CPT

## 2021-12-01 LAB
T4 FREE SERPL-MCNC: 1.55 NG/DL (ref 0.93–1.7)
TSH SERPL DL<=0.05 MIU/L-ACNC: 1.82 UIU/ML (ref 0.27–4.2)

## 2021-12-06 DIAGNOSIS — E06.3 HYPOTHYROIDISM DUE TO HASHIMOTO'S THYROIDITIS: Primary | ICD-10-CM

## 2021-12-06 DIAGNOSIS — E03.8 HYPOTHYROIDISM DUE TO HASHIMOTO'S THYROIDITIS: Primary | ICD-10-CM

## 2021-12-20 DIAGNOSIS — E55.9 VITAMIN D DEFICIENCY: Primary | ICD-10-CM

## 2022-01-10 ENCOUNTER — LAB (OUTPATIENT)
Dept: LAB | Facility: HOSPITAL | Age: 47
End: 2022-01-10

## 2022-01-10 DIAGNOSIS — E06.3 HYPOTHYROIDISM DUE TO HASHIMOTO'S THYROIDITIS: ICD-10-CM

## 2022-01-10 DIAGNOSIS — E03.8 HYPOTHYROIDISM DUE TO HASHIMOTO'S THYROIDITIS: ICD-10-CM

## 2022-01-10 DIAGNOSIS — E55.9 VITAMIN D DEFICIENCY: ICD-10-CM

## 2022-01-10 PROCEDURE — 82306 VITAMIN D 25 HYDROXY: CPT

## 2022-01-10 PROCEDURE — 84443 ASSAY THYROID STIM HORMONE: CPT

## 2022-01-10 PROCEDURE — 84439 ASSAY OF FREE THYROXINE: CPT

## 2022-01-10 PROCEDURE — 36415 COLL VENOUS BLD VENIPUNCTURE: CPT

## 2022-01-11 LAB
25(OH)D3 SERPL-MCNC: 66.7 NG/ML
T4 FREE SERPL-MCNC: 1.4 NG/DL (ref 0.93–1.7)
TSH SERPL DL<=0.05 MIU/L-ACNC: 2.58 UIU/ML (ref 0.27–4.2)

## 2022-01-12 DIAGNOSIS — E03.8 HYPOTHYROIDISM DUE TO HASHIMOTO'S THYROIDITIS: ICD-10-CM

## 2022-01-12 DIAGNOSIS — E06.3 HYPOTHYROIDISM DUE TO HASHIMOTO'S THYROIDITIS: ICD-10-CM

## 2022-01-12 RX ORDER — LEVOTHYROXINE SODIUM 137 UG/1
TABLET ORAL
Qty: 30 TABLET | Refills: 1 | Status: SHIPPED | OUTPATIENT
Start: 2022-01-12 | End: 2022-05-12 | Stop reason: SDUPTHER

## 2022-01-12 RX ORDER — LEVOTHYROXINE SODIUM 0.07 MG/1
TABLET ORAL
Qty: 40 TABLET | Refills: 3 | Status: SHIPPED | OUTPATIENT
Start: 2022-01-12 | End: 2022-05-12 | Stop reason: SDUPTHER

## 2022-05-04 ENCOUNTER — PATIENT MESSAGE (OUTPATIENT)
Dept: ENDOCRINOLOGY | Facility: CLINIC | Age: 47
End: 2022-05-04

## 2022-05-04 DIAGNOSIS — E55.9 VITAMIN D DEFICIENCY: Primary | ICD-10-CM

## 2022-05-10 ENCOUNTER — LAB (OUTPATIENT)
Dept: LAB | Facility: HOSPITAL | Age: 47
End: 2022-05-10

## 2022-05-10 DIAGNOSIS — E55.9 VITAMIN D DEFICIENCY: ICD-10-CM

## 2022-05-10 DIAGNOSIS — E03.8 HYPOTHYROIDISM DUE TO HASHIMOTO'S THYROIDITIS: ICD-10-CM

## 2022-05-10 DIAGNOSIS — E06.3 HYPOTHYROIDISM DUE TO HASHIMOTO'S THYROIDITIS: ICD-10-CM

## 2022-05-10 LAB
25(OH)D3 SERPL-MCNC: 47.8 NG/ML (ref 30–100)
T4 FREE SERPL-MCNC: 1.46 NG/DL (ref 0.93–1.7)
TSH SERPL DL<=0.05 MIU/L-ACNC: 1.67 UIU/ML (ref 0.27–4.2)

## 2022-05-10 PROCEDURE — 84443 ASSAY THYROID STIM HORMONE: CPT

## 2022-05-10 PROCEDURE — 36415 COLL VENOUS BLD VENIPUNCTURE: CPT

## 2022-05-10 PROCEDURE — 82306 VITAMIN D 25 HYDROXY: CPT

## 2022-05-10 PROCEDURE — 84439 ASSAY OF FREE THYROXINE: CPT

## 2022-05-12 DIAGNOSIS — E06.3 HYPOTHYROIDISM DUE TO HASHIMOTO'S THYROIDITIS: ICD-10-CM

## 2022-05-12 DIAGNOSIS — E03.8 HYPOTHYROIDISM DUE TO HASHIMOTO'S THYROIDITIS: ICD-10-CM

## 2022-05-12 RX ORDER — LEVOTHYROXINE SODIUM 0.07 MG/1
TABLET ORAL
Qty: 40 TABLET | Refills: 1 | Status: SHIPPED | OUTPATIENT
Start: 2022-05-12 | End: 2022-07-07 | Stop reason: SDUPTHER

## 2022-05-12 RX ORDER — LEVOTHYROXINE SODIUM 137 UG/1
TABLET ORAL
Qty: 30 TABLET | Refills: 1 | Status: SHIPPED | OUTPATIENT
Start: 2022-05-12 | End: 2022-07-07 | Stop reason: SDUPTHER

## 2022-07-07 ENCOUNTER — OFFICE VISIT (OUTPATIENT)
Dept: ENDOCRINOLOGY | Facility: CLINIC | Age: 47
End: 2022-07-07

## 2022-07-07 VITALS
DIASTOLIC BLOOD PRESSURE: 68 MMHG | OXYGEN SATURATION: 99 % | HEART RATE: 84 BPM | WEIGHT: 110 LBS | SYSTOLIC BLOOD PRESSURE: 112 MMHG | BODY MASS INDEX: 19.49 KG/M2 | HEIGHT: 63 IN

## 2022-07-07 DIAGNOSIS — E04.1 THYROID NODULE: ICD-10-CM

## 2022-07-07 DIAGNOSIS — E55.9 VITAMIN D DEFICIENCY: ICD-10-CM

## 2022-07-07 DIAGNOSIS — E06.3 HYPOTHYROIDISM DUE TO HASHIMOTO'S THYROIDITIS: Primary | ICD-10-CM

## 2022-07-07 DIAGNOSIS — E53.8 B12 DEFICIENCY: ICD-10-CM

## 2022-07-07 DIAGNOSIS — E03.8 HYPOTHYROIDISM DUE TO HASHIMOTO'S THYROIDITIS: Primary | ICD-10-CM

## 2022-07-07 PROCEDURE — 76536 US EXAM OF HEAD AND NECK: CPT | Performed by: INTERNAL MEDICINE

## 2022-07-07 PROCEDURE — 99214 OFFICE O/P EST MOD 30 MIN: CPT | Performed by: INTERNAL MEDICINE

## 2022-07-07 RX ORDER — LEVOTHYROXINE SODIUM 137 UG/1
TABLET ORAL
Qty: 30 TABLET | Refills: 1 | Status: SHIPPED | OUTPATIENT
Start: 2022-07-07 | End: 2023-01-09

## 2022-07-07 RX ORDER — LEVOTHYROXINE SODIUM 0.07 MG/1
TABLET ORAL
Qty: 40 TABLET | Refills: 1 | Status: SHIPPED | OUTPATIENT
Start: 2022-07-07 | End: 2023-01-09 | Stop reason: SDUPTHER

## 2022-07-07 NOTE — PROGRESS NOTES
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 female had concerns including Hashimoto's Thyroiditis and Goiter.      Still has cold intolerance. Myalgia is improved. Is fatigued but busy with family and work.   Has gained a few pounds - mostly central.   Overall is feeling much improved on current dosing.    Is on 75 mcg three days a week and half a 137 mcg four days per week.   Levels were normal in May.    She continues to take vitamin D and B12.  Has a history of deficiencies in both.  Has decreased her vitamin D supplementation in recent months.    The following portions of the patient's history were reviewed and updated as appropriate: allergies, current medications, past family history, past medical history, past social history, past surgical history and problem list.    Review of Systems   Endocrine: Positive for cold intolerance.   Skin:        Hair changes, thinning        /68   Pulse 84   Ht 160 cm (63\")   Wt 49.9 kg (110 lb)   SpO2 99%   BMI 19.49 kg/m²      Physical Exam  Vitals reviewed.   Constitutional:       Appearance: Normal appearance.   Cardiovascular:      Rate and Rhythm: Normal rate.   Pulmonary:      Effort: Pulmonary effort is normal.   Neurological:      General: No focal deficit present.      Mental Status: She is alert. Mental status is at baseline.   Psychiatric:         Mood and Affect: Mood normal.         Behavior: Behavior normal.              LABS AND IMAGING   CMP  Lab Results   Component Value Date    GLUCOSE 83 07/19/2021    BUN 15 09/14/2021    CREATININE 0.60 09/14/2021    EGFRIFNONA >60 09/14/2021    EGFRIFAFRI >60 09/14/2021    BCR 25 09/14/2021    K 3.9 09/14/2021    CO2 26 09/14/2021    CALCIUM 9.8 09/14/2021    ALBUMIN 4.9 09/14/2021    AST 15 09/14/2021    ALT 16 09/14/2021        CBC w/DIFF   Lab Results   Component Value Date    WBC 9.46 03/15/2022    RBC 4.75 03/15/2022    HGB " 13.3 03/15/2022    HCT 42.1 03/15/2022    MCV 89 03/15/2022    MCH 28.0 03/15/2022    MCHC 31.6 03/15/2022    RDW 13.3 03/15/2022    RDWSD 38.0 07/19/2021    MPV 10.1 03/15/2022     03/15/2022    NEUTRORELPCT 77.0 03/15/2022    LYMPHORELPCT 16.0 03/15/2022    MONORELPCT 6.0 03/15/2022    EOSRELPCT 1.0 03/15/2022    BASORELPCT 0.0 03/15/2022    AUTOIGPER 0.0 03/15/2022    NEUTROABS 7.21 (H) 03/15/2022    LYMPHSABS 1.48 03/15/2022    MONOSABS 0.61 03/15/2022    EOSABS 0.09 03/15/2022    BASOSABS 0.04 03/15/2022    AUTOIGNUM 0.03 03/15/2022    NRBC 0.0 03/15/2022     TSH  Lab Results   Component Value Date    TSH 1.670 05/10/2022    TSH 2.580 01/10/2022    TSH 1.820 11/30/2021     T4  Lab Results   Component Value Date    FREET4 1.46 05/10/2022    FREET4 1.40 01/10/2022    FREET4 1.55 11/30/2021     T3  Lab Results   Component Value Date    T3FREE 2.48 09/17/2019     Thyroid Ultrasound 08/05/21     Indication: hypothyroidism  Comparison Imaging: US 7/2020  Clinical History: Family history of medullary thyroid cancer and pheochromocytoma (MEN syndrome), patient has Hashimoto's and has had a heterogeneous gland/goiter in the past     Real time high resolution imaging of the thyroid gland was performed in transverse and longitudinal planes. Previous imaging reports were reviewed if available and compared to the current to assess stability.      Lobes:  The right lobe measured 4.6 cm L x 1.2 cm AP x 1.4 cm in TV dimension.    The isthmus measured 0.2 cm in thickness.    The left thyroid lobe measured 4.8 cm L x 1.1 cm AP x 1.0 cm in TV dimension.     Thyroid gland is heterogeneous and contains a single cysting nodule in the right lobe.     Nodule 1   located in the right mid lobe and measures 0.6 x 0.5 x 0.4 cm (L X AP X TV).  This nodule is cystic, homogeneous, hypoechoic with well-defined margin, It has no artifacts.        No pathologic lymph nodes were seen.     Impression:  New right lobe 6 mm cyst.  Mildly  heterogeneous gland with no other nodules.     Recommendation:  Repeat ultrasound in 1 year.       Thyroid Ultrasound 07/07/22    Indication: thyroid cyst, family history of suspected MEN, hypothyroidism  Comparison Imaging: US 2020 and 2021  Clinical History: hashimoto's, thyroid cyst, family history of suspected MEN, hypothyroidism    Real time high resolution imaging of the thyroid gland was performed in transverse and longitudinal planes. Previous imaging reports were reviewed if available and compared to the current to assess stability.     Lobes:  The right lobe measured 4.8 cm L x 1.0 cm AP x 1.4 cm in TV dimension.    The isthmus measured 0.3 cm in thickness.    The left thyroid lobe measured 4.3 cm L x 1.0 cm AP x 1.1 cm in TV dimension.    Thyroid gland is heterogeneous and contains single right lobe nodule.    Nodule 1   located in the right mid lobe and measures 0.6 x 0.4 x 0.5 cm (L X AP X TV).  This nodule is cystic, homogeneous, hypoechoic with well-defined margins, and Grade I vascularity on Color Flow Doppler.  It has no artifacts    No pathologic lymph nodes were seen.    Impression:  Stable 6 mm right lobe cyst, heterogeneous gland.    Recommendation:  Repeat ultrasound 1 year      Assessment and Plan    Diagnoses and all orders for this visit:    1. Hypothyroidism due to Hashimoto's thyroiditis (Primary)  Clinically and biochemically euthyroid on levothyroxine 75 mcg 3 days/week and half of a 137 mcg tab 4 days/week.  Patient has been quite sensitive to variable doses and fluctuations in her TSH but feels better now than she has in some time.  Continue current dose.  Recheck TFTs in 6 to 8 weeks.  -     T4, Free; Future  -     TSH; Future  -     levothyroxine (Synthroid) 75 MCG tablet; 75 mcg three days per week, HALF a 137 mcg tab four days per week  Dispense: 40 tablet; Refill: 1  -     levothyroxine (SYNTHROID, LEVOTHROID) 137 MCG tablet; 75 mcg three days per week, HALF a 137 mcg tab four  days per week  Dispense: 30 tablet; Refill: 1    2. Thyroid nodule  Stable subcentimeter (6 mm) right lobe cystic nodule.  Repeat ultrasound in 1 year.  -     US Thyroid    3. B12 deficiency  History of.  Check with next set of labs.  -     Vitamin B12; Future  -     Methylmalonic Acid, Serum; Future    4. Vitamin D deficiency  Recheck vitamin D with next set of labs.  Patient recently reduced supplement dose.  -     Vitamin D 25 Hydroxy; Future         Return in about 6 months (around 1/7/2023). The patient was instructed to contact the clinic with any interval questions or concerns.    Sandra Gallegos, DO   Endocrinologist    Please note that portions of this note were completed with a voice recognition program.

## 2022-10-17 ENCOUNTER — LAB (OUTPATIENT)
Dept: LAB | Facility: HOSPITAL | Age: 47
End: 2022-10-17

## 2022-10-17 DIAGNOSIS — E53.8 B12 DEFICIENCY: ICD-10-CM

## 2022-10-17 DIAGNOSIS — E03.8 HYPOTHYROIDISM DUE TO HASHIMOTO'S THYROIDITIS: ICD-10-CM

## 2022-10-17 DIAGNOSIS — E55.9 VITAMIN D DEFICIENCY: ICD-10-CM

## 2022-10-17 DIAGNOSIS — E06.3 HYPOTHYROIDISM DUE TO HASHIMOTO'S THYROIDITIS: ICD-10-CM

## 2022-10-17 PROCEDURE — 82306 VITAMIN D 25 HYDROXY: CPT

## 2022-10-17 PROCEDURE — 82607 VITAMIN B-12: CPT

## 2022-10-17 PROCEDURE — 83921 ORGANIC ACID SINGLE QUANT: CPT

## 2022-10-17 PROCEDURE — 84439 ASSAY OF FREE THYROXINE: CPT

## 2022-10-17 PROCEDURE — 84443 ASSAY THYROID STIM HORMONE: CPT

## 2022-10-17 PROCEDURE — 36415 COLL VENOUS BLD VENIPUNCTURE: CPT

## 2022-10-18 LAB
25(OH)D3 SERPL-MCNC: 65 NG/ML (ref 30–100)
T4 FREE SERPL-MCNC: 1.34 NG/DL (ref 0.93–1.7)
TSH SERPL DL<=0.05 MIU/L-ACNC: 1.93 UIU/ML (ref 0.27–4.2)
VIT B12 BLD-MCNC: 939 PG/ML (ref 211–946)

## 2022-10-21 LAB — METHYLMALONATE SERPL-SCNC: 102 NMOL/L (ref 0–378)

## 2023-01-08 DIAGNOSIS — E06.3 HYPOTHYROIDISM DUE TO HASHIMOTO'S THYROIDITIS: ICD-10-CM

## 2023-01-08 DIAGNOSIS — E03.8 HYPOTHYROIDISM DUE TO HASHIMOTO'S THYROIDITIS: ICD-10-CM

## 2023-01-09 DIAGNOSIS — E06.3 HYPOTHYROIDISM DUE TO HASHIMOTO'S THYROIDITIS: ICD-10-CM

## 2023-01-09 DIAGNOSIS — E03.8 HYPOTHYROIDISM DUE TO HASHIMOTO'S THYROIDITIS: ICD-10-CM

## 2023-01-09 RX ORDER — LEVOTHYROXINE SODIUM 0.07 MG/1
TABLET ORAL
Qty: 40 TABLET | Refills: 1 | Status: SHIPPED | OUTPATIENT
Start: 2023-01-09 | End: 2023-01-30 | Stop reason: SDUPTHER

## 2023-01-09 RX ORDER — LEVOTHYROXINE SODIUM 137 UG/1
TABLET ORAL
Qty: 30 TABLET | Refills: 1 | Status: SHIPPED | OUTPATIENT
Start: 2023-01-09 | End: 2023-01-30 | Stop reason: SDUPTHER

## 2023-01-30 DIAGNOSIS — E06.3 HYPOTHYROIDISM DUE TO HASHIMOTO'S THYROIDITIS: ICD-10-CM

## 2023-01-30 DIAGNOSIS — E03.8 HYPOTHYROIDISM DUE TO HASHIMOTO'S THYROIDITIS: ICD-10-CM

## 2023-01-30 RX ORDER — LEVOTHYROXINE SODIUM 0.07 MG/1
TABLET ORAL
Qty: 40 TABLET | Refills: 1 | Status: SHIPPED | OUTPATIENT
Start: 2023-01-30

## 2023-01-30 RX ORDER — LEVOTHYROXINE SODIUM 137 UG/1
TABLET ORAL
Qty: 30 TABLET | Refills: 1 | Status: SHIPPED | OUTPATIENT
Start: 2023-01-30

## 2023-05-04 DIAGNOSIS — E06.3 HYPOTHYROIDISM DUE TO HASHIMOTO'S THYROIDITIS: ICD-10-CM

## 2023-05-04 DIAGNOSIS — E03.8 HYPOTHYROIDISM DUE TO HASHIMOTO'S THYROIDITIS: ICD-10-CM

## 2023-05-04 RX ORDER — LEVOTHYROXINE SODIUM 0.07 MG/1
TABLET ORAL
Qty: 40 TABLET | Refills: 0 | Status: SHIPPED | OUTPATIENT
Start: 2023-05-04

## 2023-05-24 DIAGNOSIS — E06.3 HYPOTHYROIDISM DUE TO HASHIMOTO'S THYROIDITIS: Primary | ICD-10-CM

## 2023-05-24 DIAGNOSIS — E03.8 HYPOTHYROIDISM DUE TO HASHIMOTO'S THYROIDITIS: Primary | ICD-10-CM

## 2023-06-14 ENCOUNTER — LAB (OUTPATIENT)
Dept: LAB | Facility: HOSPITAL | Age: 48
End: 2023-06-14
Payer: COMMERCIAL

## 2023-06-14 DIAGNOSIS — E03.8 HYPOTHYROIDISM DUE TO HASHIMOTO'S THYROIDITIS: ICD-10-CM

## 2023-06-14 DIAGNOSIS — E06.3 HYPOTHYROIDISM DUE TO HASHIMOTO'S THYROIDITIS: ICD-10-CM

## 2023-06-14 LAB
T4 FREE SERPL-MCNC: 1.45 NG/DL (ref 0.93–1.7)
TSH SERPL DL<=0.05 MIU/L-ACNC: 1.65 UIU/ML (ref 0.27–4.2)

## 2023-06-14 PROCEDURE — 36415 COLL VENOUS BLD VENIPUNCTURE: CPT

## 2023-06-14 PROCEDURE — 84443 ASSAY THYROID STIM HORMONE: CPT

## 2023-06-14 PROCEDURE — 84439 ASSAY OF FREE THYROXINE: CPT

## 2023-10-24 DIAGNOSIS — E06.3 HYPOTHYROIDISM DUE TO HASHIMOTO'S THYROIDITIS: ICD-10-CM

## 2023-10-24 DIAGNOSIS — E03.8 HYPOTHYROIDISM DUE TO HASHIMOTO'S THYROIDITIS: ICD-10-CM

## 2023-10-24 RX ORDER — LEVOTHYROXINE SODIUM 137 UG/1
TABLET ORAL
Qty: 30 TABLET | Refills: 3 | Status: SHIPPED | OUTPATIENT
Start: 2023-10-24

## 2023-10-24 RX ORDER — LEVOTHYROXINE SODIUM 0.07 MG/1
TABLET ORAL
Qty: 40 TABLET | Refills: 3 | Status: SHIPPED | OUTPATIENT
Start: 2023-10-24

## 2023-10-24 NOTE — TELEPHONE ENCOUNTER
Rx Refill Note  Requested Prescriptions      No prescriptions requested or ordered in this encounter        Last office visit with prescribing clinician: 6/22/2023      Next office visit with prescribing clinician: Visit date not found       Maribell Lema (Jodi)  10/24/23, 12:08 EDT

## 2023-10-24 NOTE — TELEPHONE ENCOUNTER
----- Message from Kendra Perez sent at 10/24/2023  8:43 AM EDT -----  Regarding: meds  Contact: [phone]  Could you kindly send 3 month supply of the 75 ucg and 137 ucg to [facility] with bunch of refills and 3 month supply so I don't have to bother you til the next visit.    Thank you

## 2023-11-27 DIAGNOSIS — E03.8 HYPOTHYROIDISM DUE TO HASHIMOTO'S THYROIDITIS: Primary | ICD-10-CM

## 2023-11-27 DIAGNOSIS — E06.3 HYPOTHYROIDISM DUE TO HASHIMOTO'S THYROIDITIS: Primary | ICD-10-CM

## 2023-11-28 ENCOUNTER — LAB (OUTPATIENT)
Dept: LAB | Facility: HOSPITAL | Age: 48
End: 2023-11-28
Payer: COMMERCIAL

## 2023-11-28 DIAGNOSIS — E03.8 HYPOTHYROIDISM DUE TO HASHIMOTO'S THYROIDITIS: ICD-10-CM

## 2023-11-28 DIAGNOSIS — E06.3 HYPOTHYROIDISM DUE TO HASHIMOTO'S THYROIDITIS: ICD-10-CM

## 2023-11-28 LAB
T4 FREE SERPL-MCNC: 1.46 NG/DL (ref 0.93–1.7)
TSH SERPL DL<=0.05 MIU/L-ACNC: 2.3 UIU/ML (ref 0.27–4.2)

## 2023-11-28 PROCEDURE — 84443 ASSAY THYROID STIM HORMONE: CPT

## 2023-11-28 PROCEDURE — 84439 ASSAY OF FREE THYROXINE: CPT

## 2023-11-30 DIAGNOSIS — E03.8 HYPOTHYROIDISM DUE TO HASHIMOTO'S THYROIDITIS: ICD-10-CM

## 2023-11-30 DIAGNOSIS — E06.3 HYPOTHYROIDISM DUE TO HASHIMOTO'S THYROIDITIS: ICD-10-CM

## 2023-11-30 RX ORDER — LEVOTHYROXINE SODIUM 0.07 MG/1
75 TABLET ORAL DAILY
Qty: 90 TABLET | Refills: 1 | Status: SHIPPED | OUTPATIENT
Start: 2023-11-30

## 2024-02-19 DIAGNOSIS — E06.3 HYPOTHYROIDISM DUE TO HASHIMOTO'S THYROIDITIS: ICD-10-CM

## 2024-02-19 DIAGNOSIS — E03.8 HYPOTHYROIDISM DUE TO HASHIMOTO'S THYROIDITIS: ICD-10-CM

## 2024-02-20 NOTE — TELEPHONE ENCOUNTER
Rx Refill Note  Requested Prescriptions     Pending Prescriptions Disp Refills    levothyroxine (SYNTHROID, LEVOTHROID) 75 MCG tablet [Pharmacy Med Name: Levothyroxine Sodium 75 MCG Oral Tablet] 90 tablet 0     Sig: Take 1 tablet by mouth once daily      Last office visit with prescribing clinician: 6/22/2023     Next office visit with prescribing clinician: Visit date not found       Adalid Driscoll MA  02/20/24, 14:07 EST

## 2024-02-21 RX ORDER — LEVOTHYROXINE SODIUM 0.07 MG/1
75 TABLET ORAL DAILY
Qty: 90 TABLET | Refills: 0 | Status: SHIPPED | OUTPATIENT
Start: 2024-02-21

## 2024-04-23 DIAGNOSIS — E06.3 HYPOTHYROIDISM DUE TO HASHIMOTO'S THYROIDITIS: ICD-10-CM

## 2024-04-23 DIAGNOSIS — E03.8 HYPOTHYROIDISM DUE TO HASHIMOTO'S THYROIDITIS: ICD-10-CM

## 2024-04-23 RX ORDER — LEVOTHYROXINE SODIUM 0.07 MG/1
75 TABLET ORAL DAILY
Qty: 90 TABLET | Refills: 0 | Status: SHIPPED | OUTPATIENT
Start: 2024-04-23

## 2024-05-08 ENCOUNTER — LAB (OUTPATIENT)
Dept: LAB | Facility: HOSPITAL | Age: 49
End: 2024-05-08
Payer: COMMERCIAL

## 2024-05-08 DIAGNOSIS — E06.3 HYPOTHYROIDISM DUE TO HASHIMOTO'S THYROIDITIS: ICD-10-CM

## 2024-05-08 DIAGNOSIS — E03.8 HYPOTHYROIDISM DUE TO HASHIMOTO'S THYROIDITIS: ICD-10-CM

## 2024-05-08 LAB
T4 FREE SERPL-MCNC: 1.56 NG/DL (ref 0.93–1.7)
TSH SERPL DL<=0.05 MIU/L-ACNC: 1.3 UIU/ML (ref 0.27–4.2)

## 2024-05-08 PROCEDURE — 84439 ASSAY OF FREE THYROXINE: CPT

## 2024-05-08 PROCEDURE — 84443 ASSAY THYROID STIM HORMONE: CPT

## 2024-05-13 DIAGNOSIS — E03.8 HYPOTHYROIDISM DUE TO HASHIMOTO'S THYROIDITIS: ICD-10-CM

## 2024-05-13 DIAGNOSIS — E06.3 HYPOTHYROIDISM DUE TO HASHIMOTO'S THYROIDITIS: ICD-10-CM

## 2024-07-18 ENCOUNTER — OFFICE VISIT (OUTPATIENT)
Dept: ENDOCRINOLOGY | Facility: CLINIC | Age: 49
End: 2024-07-18
Payer: COMMERCIAL

## 2024-07-18 VITALS
OXYGEN SATURATION: 100 % | HEIGHT: 63 IN | WEIGHT: 109 LBS | DIASTOLIC BLOOD PRESSURE: 58 MMHG | HEART RATE: 82 BPM | BODY MASS INDEX: 19.31 KG/M2 | SYSTOLIC BLOOD PRESSURE: 108 MMHG

## 2024-07-18 DIAGNOSIS — E03.8 HYPOTHYROIDISM DUE TO HASHIMOTO'S THYROIDITIS: Primary | ICD-10-CM

## 2024-07-18 DIAGNOSIS — E04.1 THYROID CYST: ICD-10-CM

## 2024-07-18 DIAGNOSIS — E06.3 HYPOTHYROIDISM DUE TO HASHIMOTO'S THYROIDITIS: Primary | ICD-10-CM

## 2024-07-18 DIAGNOSIS — R53.82 CHRONIC FATIGUE: ICD-10-CM

## 2024-07-18 NOTE — PROGRESS NOTES
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 female had concerns including Thyroid Problem (Hashimoto's thyroiditis/).      Has fatigue, persistent hair thinning.   Is eating well, getting adequate sleep but is disrupted. Is sensitive to even OTC sleep aids.  Stays active.   Cycles were irregular. Is on OCP and now normal.     Has felt pretty well on levothyroxine 75 mcg daily.     The following portions of the patient's history were reviewed and updated as appropriate: allergies, current medications, past family history, past medical history, past social history, past surgical history, and problem list.    Review of Systems   Constitutional:  Positive for fatigue.   Endocrine:        Hair thinning   Psychiatric/Behavioral:  Positive for sleep disturbance.         /58 (BP Location: Right arm, Patient Position: Sitting, Cuff Size: Adult)   Pulse 82   Ht 160 cm (63\")   Wt 49.4 kg (109 lb)   SpO2 100%   BMI 19.31 kg/m²      Physical Exam  Vitals reviewed.   Constitutional:       Appearance: Normal appearance.   Cardiovascular:      Rate and Rhythm: Normal rate.   Pulmonary:      Effort: Pulmonary effort is normal.   Neurological:      General: No focal deficit present.      Mental Status: She is alert. Mental status is at baseline.   Psychiatric:         Mood and Affect: Mood normal.         Behavior: Behavior normal.              LABS AND IMAGING   CMP  Lab Results   Component Value Date    GLUCOSE 95 06/22/2023    BUN 13 06/22/2023    CREATININE 0.66 06/22/2023    EGFRIFNONA >60 09/14/2021    EGFRIFAFRI >60 09/14/2021    BCR 19.7 06/22/2023    K 4.5 06/22/2023    CO2 27.0 06/22/2023    CALCIUM 9.0 06/22/2023    ALBUMIN 4.4 06/22/2023    AST 19 06/22/2023    ALT 15 06/22/2023        CBC w/DIFF   Lab Results   Component Value Date    WBC 5.93 08/07/2023    RBC 4.24 08/07/2023    HGB 11.9 08/07/2023    HCT 38.5 " 08/07/2023    MCV 91 08/07/2023    MCH 28.1 08/07/2023    MCHC 30.9 08/07/2023    RDW 13.2 08/07/2023    RDWSD 38.9 06/22/2023    MPV 10.1 08/07/2023     08/07/2023    NEUTRORELPCT 71.0 08/07/2023    LYMPHORELPCT 21.0 08/07/2023    MONORELPCT 5.0 08/07/2023    EOSRELPCT 2.0 08/07/2023    BASORELPCT 1.0 08/07/2023    AUTOIGPER 0.0 08/07/2023    NEUTROABS 4.19 08/07/2023    LYMPHSABS 1.24 08/07/2023    MONOSABS 0.31 08/07/2023    EOSABS 0.14 08/07/2023    BASOSABS 0.04 08/07/2023    AUTOIGNUM 0.01 08/07/2023    NRBC 0.0 08/07/2023     TSH  Lab Results   Component Value Date    TSH 1.300 05/08/2024    TSH 2.300 11/28/2023    TSH 1.650 06/14/2023     T4  Lab Results   Component Value Date    FREET4 1.56 05/08/2024    FREET4 1.46 11/28/2023    FREET4 1.45 06/14/2023     T3  Lab Results   Component Value Date    T3FREE 2.48 09/17/2019     Thyroid Ultrasound 06/22/23     Indication: Thyroid cyst, family history of medullary thyroid cancer, Hashimoto's  Comparison Imaging: Ultrasound 2020, 2021, 2022  Clinical History: Right thyroid cyst, Hashimoto's, family history of medullary thyroid cancer     Real time high resolution imaging of the thyroid gland was performed in transverse and longitudinal planes. Previous imaging reports were reviewed if available and compared to the current to assess stability.      Lobes:  The right lobe measured 5.4 cm L x 1.1 cm AP x 1.5 cm in TV dimension.    The isthmus measured 0.2 cm in thickness.    The left thyroid lobe measured 4.6 cm L x 0.8 cm AP x 1.1 cm in TV dimension.     Thyroid gland is heterogeneous and contains single right lobe cystic nodule.     Nodule 1   located in the right mid lobe and measures 0.6 x 0.4 x 0.4 cm (L X AP X TV).  This nodule is cystic, heterogeneous, hypoechoic with well-defined margins, and Grade I vascularity on Color Flow Doppler.  It has no artifacts.     No pathologic lymph nodes were seen.     Impression:  Heterogeneous gland.  Stable small  right lobe cyst measuring 6 mm.     Recommendation:  Repeat ultrasound in 1 year.       Thyroid Ultrasound 07/18/24    Indication: Thyroid cyst, family history of medullary thyroid cancer, Hashimoto's  Comparison Imaging: Ultrasound 2020, 2021, 2022  Clinical History: Right thyroid cyst, Hashimoto's, family history of medullary thyroid cancer    Real time high resolution imaging of the thyroid gland was performed in transverse and longitudinal planes. Previous imaging reports were reviewed if available and compared to the current to assess stability.     Lobes:  The right lobe measured 3.9 cm L x 1.1 cm AP x 1.3 cm in TV dimension.    The isthmus measured 0.2 cm in thickness.    The left thyroid lobe measured 4.3 cm L x 0.7 cm AP x 1.3 cm in TV dimension.    Thyroid gland is heterogeneous and contains no nodules.    No pathologic lymph nodes were seen.    Impression:  Heterogeneous gland with no distinct nodules.  Consistent with known autoimmune thyroid disease.    Recommendation:  Repeat ultrasound in 1 year.      Assessment and Plan    Diagnoses and all orders for this visit:    1. Hypothyroidism due to Hashimoto's thyroiditis (Primary)  TSH has been in optimal range on levothyroxine 75 mcg daily.  Still with fatigue, poor sleep, hair thinning.  Repeat TFTs today.  Consider addition of T3 if TSH is in an optimal range.  -     TSH; Future  -     T4, Free; Future  -     Thyroid Peroxidase Antibody; Future    2. Thyroid cyst  See detailed US report as above.   The 6 mm cyst noted in the right lobe on ultrasound last year has not detected this year.  She has heterogeneous gland consistent with known autoimmune thyroid disease.  No distinct nodules or cysts.  Can monitor yearly due to family history of medullary thyroid cancer.-     US Thyroid    3. Chronic fatigue  Check labs to rule out other causes of chronic fatigue.  -     Cortisol; Future  -     Comprehensive Metabolic Panel; Future  -     Vitamin B12; Future  -      Iron Profile; Future  -     Ferritin; Future  -     CBC (No Diff); Future  -     ACTH; Future  -     Homocysteine; Future  -     Lipid Panel; Future  -     Methylmalonic Acid, Serum; Future         Return in about 1 year (around 7/18/2025) for next scheduled follow up, with thyroid ultrasound ** 30 min appt. The patient was instructed to contact the clinic with any interval questions or concerns.    Electronically signed by: Sandra Gallegos DO   Endocrinologist    Please note that portions of this note were completed with a voice recognition program.

## 2024-07-22 DIAGNOSIS — R53.82 CHRONIC FATIGUE: Primary | ICD-10-CM

## 2024-07-25 ENCOUNTER — LAB (OUTPATIENT)
Facility: HOSPITAL | Age: 49
End: 2024-07-25
Payer: COMMERCIAL

## 2024-07-25 DIAGNOSIS — E06.3 HYPOTHYROIDISM DUE TO HASHIMOTO'S THYROIDITIS: ICD-10-CM

## 2024-07-25 DIAGNOSIS — E03.8 HYPOTHYROIDISM DUE TO HASHIMOTO'S THYROIDITIS: ICD-10-CM

## 2024-07-25 DIAGNOSIS — R53.82 CHRONIC FATIGUE: ICD-10-CM

## 2024-07-25 LAB
25(OH)D3 SERPL-MCNC: 64.8 NG/ML (ref 30–100)
ALBUMIN SERPL-MCNC: 4.4 G/DL (ref 3.5–5.2)
ALBUMIN/GLOB SERPL: 1.4 G/DL
ALP SERPL-CCNC: 45 U/L (ref 39–117)
ALT SERPL W P-5'-P-CCNC: 12 U/L (ref 1–33)
ANION GAP SERPL CALCULATED.3IONS-SCNC: 10.5 MMOL/L (ref 5–15)
AST SERPL-CCNC: 14 U/L (ref 1–32)
BILIRUB SERPL-MCNC: 0.3 MG/DL (ref 0–1.2)
BUN SERPL-MCNC: 12 MG/DL (ref 6–20)
BUN/CREAT SERPL: 15.6 (ref 7–25)
CALCIUM SPEC-SCNC: 9.5 MG/DL (ref 8.6–10.5)
CHLORIDE SERPL-SCNC: 105 MMOL/L (ref 98–107)
CHOLEST SERPL-MCNC: 173 MG/DL (ref 0–200)
CO2 SERPL-SCNC: 22.5 MMOL/L (ref 22–29)
CORTIS SERPL-MCNC: 14.6 MCG/DL
CREAT SERPL-MCNC: 0.77 MG/DL (ref 0.57–1)
DEPRECATED RDW RBC AUTO: 37.8 FL (ref 37–54)
EGFRCR SERPLBLD CKD-EPI 2021: 95.9 ML/MIN/1.73
ERYTHROCYTE [DISTWIDTH] IN BLOOD BY AUTOMATED COUNT: 11.9 % (ref 12.3–15.4)
FERRITIN SERPL-MCNC: 37.8 NG/ML (ref 13–150)
GLOBULIN UR ELPH-MCNC: 3.1 GM/DL
GLUCOSE SERPL-MCNC: 86 MG/DL (ref 65–99)
HCT VFR BLD AUTO: 40.2 % (ref 34–46.6)
HCYS SERPL-MCNC: 5.7 UMOL/L (ref 0–15)
HDLC SERPL-MCNC: 55 MG/DL (ref 40–60)
HGB BLD-MCNC: 12.7 G/DL (ref 12–15.9)
IRON 24H UR-MRATE: 120 MCG/DL (ref 37–145)
IRON SATN MFR SERPL: 30 % (ref 20–50)
LDLC SERPL CALC-MCNC: 100 MG/DL (ref 0–100)
LDLC/HDLC SERPL: 1.78 {RATIO}
MCH RBC QN AUTO: 27.8 PG (ref 26.6–33)
MCHC RBC AUTO-ENTMCNC: 31.6 G/DL (ref 31.5–35.7)
MCV RBC AUTO: 88 FL (ref 79–97)
PLATELET # BLD AUTO: 265 10*3/MM3 (ref 140–450)
PMV BLD AUTO: 10.2 FL (ref 6–12)
POTASSIUM SERPL-SCNC: 4.4 MMOL/L (ref 3.5–5.2)
PROT SERPL-MCNC: 7.5 G/DL (ref 6–8.5)
RBC # BLD AUTO: 4.57 10*6/MM3 (ref 3.77–5.28)
SODIUM SERPL-SCNC: 138 MMOL/L (ref 136–145)
T4 FREE SERPL-MCNC: 1.45 NG/DL (ref 0.92–1.68)
TIBC SERPL-MCNC: 407 MCG/DL (ref 298–536)
TRANSFERRIN SERPL-MCNC: 273 MG/DL (ref 200–360)
TRIGL SERPL-MCNC: 101 MG/DL (ref 0–150)
TSH SERPL DL<=0.05 MIU/L-ACNC: 2.09 UIU/ML (ref 0.27–4.2)
VIT B12 BLD-MCNC: >2000 PG/ML (ref 211–946)
VLDLC SERPL-MCNC: 18 MG/DL (ref 5–40)
WBC NRBC COR # BLD AUTO: 6.28 10*3/MM3 (ref 3.4–10.8)

## 2024-07-25 PROCEDURE — 82024 ASSAY OF ACTH: CPT

## 2024-07-25 PROCEDURE — 83540 ASSAY OF IRON: CPT

## 2024-07-25 PROCEDURE — 80050 GENERAL HEALTH PANEL: CPT

## 2024-07-25 PROCEDURE — 82533 TOTAL CORTISOL: CPT

## 2024-07-25 PROCEDURE — 82607 VITAMIN B-12: CPT

## 2024-07-25 PROCEDURE — 84466 ASSAY OF TRANSFERRIN: CPT

## 2024-07-25 PROCEDURE — 80061 LIPID PANEL: CPT

## 2024-07-25 PROCEDURE — 83090 ASSAY OF HOMOCYSTEINE: CPT

## 2024-07-25 PROCEDURE — 83921 ORGANIC ACID SINGLE QUANT: CPT

## 2024-07-25 PROCEDURE — 82728 ASSAY OF FERRITIN: CPT

## 2024-07-25 PROCEDURE — 82306 VITAMIN D 25 HYDROXY: CPT

## 2024-07-25 PROCEDURE — 86376 MICROSOMAL ANTIBODY EACH: CPT

## 2024-07-25 PROCEDURE — 84439 ASSAY OF FREE THYROXINE: CPT

## 2024-07-26 ENCOUNTER — TELEPHONE (OUTPATIENT)
Dept: ENDOCRINOLOGY | Facility: CLINIC | Age: 49
End: 2024-07-26

## 2024-07-26 LAB
ACTH PLAS-MCNC: 9.3 PG/ML (ref 7.2–63.3)
THYROPEROXIDASE AB SERPL-ACNC: 52 IU/ML (ref 0–34)

## 2024-07-26 NOTE — TELEPHONE ENCOUNTER
----- [facility] sent at 7/26/2024  9:03 AM EDT -----  Regarding: Vit D  Contact: [phone]  Good morning, yes can try little bump in levo but little worried, seems like I increase once a year, does this mean I am burning out my thyroid?    I thought iron looked ok, I take MVT, I can take Fe a few times a week and see what happens

## 2024-07-29 DIAGNOSIS — E06.3 HYPOTHYROIDISM DUE TO HASHIMOTO'S THYROIDITIS: ICD-10-CM

## 2024-07-29 DIAGNOSIS — E03.8 HYPOTHYROIDISM DUE TO HASHIMOTO'S THYROIDITIS: ICD-10-CM

## 2024-07-29 RX ORDER — LEVOTHYROXINE SODIUM 88 UG/1
88 TABLET ORAL DAILY
Qty: 30 TABLET | Refills: 5 | Status: SHIPPED | OUTPATIENT
Start: 2024-07-29

## 2024-07-30 LAB — METHYLMALONATE SERPL-SCNC: 138 NMOL/L (ref 0–378)

## 2024-10-08 ENCOUNTER — LAB (OUTPATIENT)
Facility: HOSPITAL | Age: 49
End: 2024-10-08
Payer: COMMERCIAL

## 2024-10-08 DIAGNOSIS — E06.3 HYPOTHYROIDISM DUE TO HASHIMOTO'S THYROIDITIS: ICD-10-CM

## 2024-10-08 PROCEDURE — 84443 ASSAY THYROID STIM HORMONE: CPT

## 2024-10-08 PROCEDURE — 84439 ASSAY OF FREE THYROXINE: CPT

## 2024-10-09 LAB
T4 FREE SERPL-MCNC: 1.49 NG/DL (ref 0.92–1.68)
TSH SERPL DL<=0.05 MIU/L-ACNC: 1.86 UIU/ML (ref 0.27–4.2)

## 2024-10-14 DIAGNOSIS — E06.3 HYPOTHYROIDISM DUE TO HASHIMOTO'S THYROIDITIS: ICD-10-CM

## 2024-10-14 RX ORDER — LEVOTHYROXINE SODIUM 88 UG/1
88 TABLET ORAL DAILY
Qty: 90 TABLET | Refills: 3 | Status: SHIPPED | OUTPATIENT
Start: 2024-10-14

## 2024-10-17 DIAGNOSIS — E06.3 HYPOTHYROIDISM DUE TO HASHIMOTO'S THYROIDITIS: ICD-10-CM

## 2024-10-17 RX ORDER — LEVOTHYROXINE SODIUM 75 UG/1
75 TABLET ORAL DAILY
Qty: 90 TABLET | Refills: 0 | OUTPATIENT
Start: 2024-10-17

## 2025-04-08 ENCOUNTER — TELEPHONE (OUTPATIENT)
Dept: ENDOCRINOLOGY | Facility: CLINIC | Age: 50
End: 2025-04-08

## 2025-04-08 NOTE — TELEPHONE ENCOUNTER
Hub staff attempted to follow warm transfer process and was unsuccessful     Caller: Kendra Perez Dr.    Relationship to patient: Self    Best call back number: [phone]     Patient is needing: PT NEEDS LABWORK IN CHART

## 2025-04-08 NOTE — TELEPHONE ENCOUNTER
PATIENT ALSO IS CALLING NEEDING TO HAVE AN ULTRASOUND WITH DR. CHAMBERS. SHE IS ALSO A DRChris HERSELF SO TAKING TIME OFF IS HARD FOR HER. SHE IS HOPING DR. CHAMBERS CAN WORK HER IN FOR THYROID ULTRASOUND ON 5/14/25 AROUND 9:30 AM AS SHE IS OFF THIS DAY AND HAS OTHER DRChris APPOINTMENTS. SHE ALSO NEEDS ORDERS PLACED IN CHART TO HAVE THYROID FUNCTION LABS DRAWN. SHE WILL HAVE THESE DRAWN AT . PATIENTS NUMBER [phone]

## 2025-04-08 NOTE — TELEPHONE ENCOUNTER
Spoke with patient.  Appt has been scheduled for 05/14/2025 and labs are in the system from October.

## 2025-04-08 NOTE — TELEPHONE ENCOUNTER
Hub staff attempted to follow warm transfer process and was unsuccessful     Caller: Kendra Perez Dr.    Relationship to patient: Self    Best call back number: [phone]     Patient is needing: PT CALLED BACK IN RETURNING CALL. PLEASE CALL BACK AND ADVISE.

## 2025-04-17 ENCOUNTER — LAB (OUTPATIENT)
Facility: HOSPITAL | Age: 50
End: 2025-04-17
Payer: COMMERCIAL

## 2025-04-17 DIAGNOSIS — E06.3 HYPOTHYROIDISM DUE TO HASHIMOTO'S THYROIDITIS: ICD-10-CM

## 2025-04-17 PROCEDURE — 84443 ASSAY THYROID STIM HORMONE: CPT

## 2025-04-17 PROCEDURE — 84439 ASSAY OF FREE THYROXINE: CPT

## 2025-04-18 LAB
T4 FREE SERPL-MCNC: 1.59 NG/DL (ref 0.92–1.68)
TSH SERPL DL<=0.05 MIU/L-ACNC: 0.96 UIU/ML (ref 0.27–4.2)

## 2025-05-14 ENCOUNTER — OFFICE VISIT (OUTPATIENT)
Dept: ENDOCRINOLOGY | Facility: CLINIC | Age: 50
End: 2025-05-14
Payer: COMMERCIAL

## 2025-05-14 VITALS
OXYGEN SATURATION: 100 % | DIASTOLIC BLOOD PRESSURE: 68 MMHG | HEIGHT: 63 IN | SYSTOLIC BLOOD PRESSURE: 102 MMHG | WEIGHT: 110 LBS | HEART RATE: 82 BPM | BODY MASS INDEX: 19.49 KG/M2

## 2025-05-14 DIAGNOSIS — Z80.8 FAMILY HISTORY OF THYROID CANCER: ICD-10-CM

## 2025-05-14 DIAGNOSIS — E06.3 HYPOTHYROIDISM DUE TO HASHIMOTO'S THYROIDITIS: Primary | ICD-10-CM

## 2025-05-14 RX ORDER — LIOTHYRONINE SODIUM 5 UG/1
5 TABLET ORAL DAILY
Qty: 30 TABLET | Refills: 5 | Status: SHIPPED | OUTPATIENT
Start: 2025-05-14 | End: 2026-05-14

## 2025-05-14 RX ORDER — LEVOTHYROXINE SODIUM 75 UG/1
75 TABLET ORAL DAILY
Qty: 30 TABLET | Refills: 5 | Status: SHIPPED | OUTPATIENT
Start: 2025-05-14

## 2025-05-14 NOTE — PROGRESS NOTES
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      On levothyroxine 88 mcg daily.  TFTs about a month ago in an optimal range.    She fell in January. Injured her foot. Was a little less active.     Notes fatigue. Sleep isn't restorative.   Is trying to exercise.     Wondering if some symptoms are due to perimenopause. Sees GYN today.     The following portions of the patient's history were reviewed and updated as appropriate: allergies, current medications, past family history, past medical history, past social history, past surgical history, and problem list.    Review of Systems   Constitutional:  Positive for fatigue.   Endocrine:        Hair loss/thinning   Musculoskeletal:  Positive for arthralgias.   Psychiatric/Behavioral:  Positive for sleep disturbance.         /68 (BP Location: Left arm, Patient Position: Sitting, Cuff Size: Adult)   Pulse 82   Ht 160 cm (63\")   Wt 49.9 kg (110 lb)   SpO2 100%   BMI 19.49 kg/m²      Physical Exam  Vitals reviewed.   Constitutional:       Appearance: Normal appearance.   Cardiovascular:      Rate and Rhythm: Normal rate.   Pulmonary:      Effort: Pulmonary effort is normal.   Neurological:      General: No focal deficit present.      Mental Status: She is alert. Mental status is at baseline.   Psychiatric:         Mood and Affect: Mood normal.         Behavior: Behavior normal.              LABS AND IMAGING   CMP  Lab Results   Component Value Date    GLUCOSE 86 07/25/2024    BUN 12 07/25/2024    CREATININE 0.77 07/25/2024    EGFRIFNONA >60 09/14/2021    EGFRIFAFRI >60 09/14/2021    BCR 15.6 07/25/2024    K 4.4 07/25/2024    CO2 22.5 07/25/2024    CALCIUM 9.5 07/25/2024    ALBUMIN 4.4 07/25/2024    AST 14 07/25/2024    ALT 12 07/25/2024        CBC w/DIFF   Lab Results   Component Value Date    WBC 7.93 " 08/27/2024    RBC 4.54 08/27/2024    HGB 12.7 08/27/2024    HCT 41.5 08/27/2024    MCV 91 08/27/2024    MCH 28.0 08/27/2024    MCHC 30.6 (L) 08/27/2024    RDW 13.3 08/27/2024    RDWSD 37.8 07/25/2024    MPV 10.1 08/27/2024     08/27/2024    NEUTRORELPCT 57.0 08/27/2024    LYMPHORELPCT 31.0 08/27/2024    MONORELPCT 7.0 08/27/2024    EOSRELPCT 4.0 08/27/2024    BASORELPCT 1.0 08/27/2024    AUTOIGPER 0.0 08/27/2024    NEUTROABS 4.60 08/27/2024    LYMPHSABS 2.42 08/27/2024    MONOSABS 0.53 08/27/2024    EOSABS 0.32 08/27/2024    BASOSABS 0.04 08/27/2024    AUTOIGNUM 0.02 08/27/2024    NRBC 0.0 08/27/2024     TSH  Lab Results   Component Value Date    TSH 0.965 04/17/2025    TSH 1.860 10/08/2024    TSH 2.090 07/25/2024     T4  Lab Results   Component Value Date    FREET4 1.59 04/17/2025    FREET4 1.49 10/08/2024    FREET4 1.45 07/25/2024     TPO  Lab Results   Component Value Date    THYROIDAB 52 (H) 07/25/2024       Thyroid Ultrasound 07/18/24     Indication: Thyroid cyst, family history of medullary thyroid cancer, Hashimoto's  Comparison Imaging: Ultrasound 2020, 2021, 2022  Clinical History: Right thyroid cyst, Hashimoto's, family history of medullary thyroid cancer     Real time high resolution imaging of the thyroid gland was performed in transverse and longitudinal planes. Previous imaging reports were reviewed if available and compared to the current to assess stability.      Lobes:  The right lobe measured 3.9 cm L x 1.1 cm AP x 1.3 cm in TV dimension.    The isthmus measured 0.2 cm in thickness.    The left thyroid lobe measured 4.3 cm L x 0.7 cm AP x 1.3 cm in TV dimension.     Thyroid gland is heterogeneous and contains no nodules.     No pathologic lymph nodes were seen.     Impression:  Heterogeneous gland with no distinct nodules.  Consistent with known autoimmune thyroid disease.     Recommendation:  Repeat ultrasound in 1 year.      Thyroid Ultrasound 05/14/25    Indication:  Thyroid cyst, family  history of medullary thyroid cancer, Hashimoto's  Comparison Imaging: Ultrasound 2020, 2021, 2022, 2023, 2024  Clinical History: Right thyroid cyst, Hashimoto's, family history of medullary thyroid cancer    Real time high resolution imaging of the thyroid gland was performed in transverse and longitudinal planes. Previous imaging reports were reviewed if available and compared to the current to assess stability.     Lobes:  The right lobe measured 4.4 cm L x 1.2 cm AP x 1.3 cm in TV dimension.    The left thyroid lobe measured 4.4 cm L x 0.8 cm AP x 1.0 cm in TV dimension.    Thyroid gland is heterogeneous and contains no distinct nodules.    No pathologic lymph nodes were seen.    Impression:  Heterogeneous gland with no nodules. Consistent with known autoimmune thyroid disease.    Recommendation:  Repeat ultrasound in 1 year.      Assessment and Plan    Diagnoses and all orders for this visit:    1. Hypothyroidism due to Hashimoto's thyroiditis (Primary)  On levothyroxine 88 mcg daily and TFTs recently on optimal range but still with persistent symptoms of fatigue and arthralgias.  Changed to combination therapy with reduction in levothyroxine to 75 mcg and add liothyronine 5 mcg daily.  Repeat TFTs in about 6 weeks.  -     levothyroxine (SYNTHROID, LEVOTHROID) 75 MCG tablet; Take 1 tablet by mouth Daily.  Dispense: 30 tablet; Refill: 5  -     liothyronine (CYTOMEL) 5 MCG tablet; Take 1 tablet by mouth Daily.  Dispense: 30 tablet; Refill: 5  -     US Thyroid  -     T4, Free; Future  -     TSH; Future  -     T3; Future    2. Family history of thyroid cancer  -     US Thyroid  Due to family history (maternal grandmother) of MEN syndrome (pheochromocytoma, medullary thyroid cancer), additional genetic evaluation may be warranted/appropriate.  She recalls her mother had RET oncogene testing 20+ years ago that was negative.  No other family members with malignancies related to MEN syndrome. Referral to genetics  placed.   She prefers yearly ultrasounds. Stable today. No nodules.     Return in about 1 year (around 5/14/2026) for next scheduled follow up, with thyroid ultrasound ** 30 min appt. The patient was instructed to contact the clinic with any interval questions or concerns.    Electronically signed by: Sandra Gallegos DO   Endocrinologist    Please note that portions of this note were completed with a voice recognition program.

## 2025-07-02 ENCOUNTER — LAB (OUTPATIENT)
Facility: HOSPITAL | Age: 50
End: 2025-07-02
Payer: COMMERCIAL

## 2025-07-02 DIAGNOSIS — E06.3 HYPOTHYROIDISM DUE TO HASHIMOTO'S THYROIDITIS: ICD-10-CM

## 2025-07-02 PROCEDURE — 84480 ASSAY TRIIODOTHYRONINE (T3): CPT

## 2025-07-02 PROCEDURE — 84439 ASSAY OF FREE THYROXINE: CPT

## 2025-07-02 PROCEDURE — 84443 ASSAY THYROID STIM HORMONE: CPT

## 2025-07-03 LAB
T3 SERPL-MCNC: 96.7 NG/DL (ref 80–200)
T4 FREE SERPL-MCNC: 1.26 NG/DL (ref 0.92–1.68)
TSH SERPL DL<=0.05 MIU/L-ACNC: 1.18 UIU/ML (ref 0.27–4.2)

## 2025-07-14 DIAGNOSIS — E06.3 HYPOTHYROIDISM DUE TO HASHIMOTO'S THYROIDITIS: ICD-10-CM

## 2025-07-14 RX ORDER — LEVOTHYROXINE SODIUM 88 UG/1
88 TABLET ORAL DAILY
Qty: 30 TABLET | Refills: 5 | Status: SHIPPED | OUTPATIENT
Start: 2025-07-14

## 2025-07-15 ENCOUNTER — TELEPHONE (OUTPATIENT)
Dept: GENETICS | Facility: HOSPITAL | Age: 50
End: 2025-07-15
Payer: COMMERCIAL

## 2025-07-15 NOTE — TELEPHONE ENCOUNTER
Called the patient regarding rescheduling of the genetic counseling appointment. Someone picked up the call and then the call was lost. Not sure if they meant to hang-up or not.

## 2025-07-23 ENCOUNTER — CLINICAL SUPPORT (OUTPATIENT)
Facility: HOSPITAL | Age: 50
End: 2025-07-23
Payer: COMMERCIAL

## 2025-07-23 DIAGNOSIS — Z84.89 FAMILY HISTORY OF PHEOCHROMOCYTOMA: ICD-10-CM

## 2025-07-23 DIAGNOSIS — Z80.8 FAMILY HISTORY OF THYROID CANCER: ICD-10-CM

## 2025-07-23 DIAGNOSIS — Z13.79 GENETIC TESTING: Primary | ICD-10-CM

## 2025-07-23 DIAGNOSIS — Z80.42 FAMILY HISTORY OF MALIGNANT NEOPLASM OF PROSTATE: ICD-10-CM

## 2025-07-23 NOTE — PROGRESS NOTES
Genetic counseling was provided via telehealth.  Patient's name and date of birth was confirmed and confirmed that patient was physically located in [place] at the time of the appointment.    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 She was 17 years old at menarche and had her first child at 37. She follows with endocrinology due to a personal history of hypothyroidism. She recently had a mammogram. Her first colonoscopy was in 2024 and one polyp was removed. 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 She was interested in discussing her risk for a hereditary cancer syndrome. Dr. Perez was interested in pursuing a multigene panel, and therefore the CancerNext-Expanded panel was ordered through [facility] which analyzes RET and 76 additional genes associated with an increased cancer risk.     FAMILY HISTORY:  Mat. Grandmother:  Medullary thyroid cancer, early 40s; Pheochromocytoma, early 40s  Mother:   Subtotal thyroidectomy due to thyroid adenoma; multinodular goiter  Mat. Aunt:   Mucocutaneous neuromas  Mat. Aunt:   Thyroidectomy in her 30s due to severe Graves disease  Mat. Grandfather:  Prostate cancer  Pat. Aunt:   Multiple Myeloma    We do not have medical records confirming the diagnoses in Ms. Perez's family.    RISK ASSESSMENT: Dr. Perez's family history of medullary thyroid cancer and pheochromocytoma led to concern regarding a hereditary cancer syndrome.  Approximately 25-30% of all individuals with MTC have a germline RET mutation. When affected individuals are unavailable for genetic testing, it is appropriate to offer testing to unaffected relatives. We discussed the option of testing via a multigene panel and Dr. Perez was interested in pursuing testing.       GENETIC COUNSELING:  We reviewed the family history information in detail.  Cases of cancer follow three general patterns:  sporadic, familial, and hereditary.  While most cancer is sporadic, some cases appear to occur in family clusters.  These cases are said to be familial and account for 10-20% of certain cancer cases.  Familial cases may be due to a combination of shared genes and environmental factors among family members.  In even fewer families (~10%), the cancer is said to be inherited, and the genes responsible for the cancer are known.      Family histories typical of hereditary cancer syndromes usually include multiple first- and second-degree relatives diagnosed with cancer types that define a syndrome.  These cases tend to be diagnosed at younger-than-expected ages and can be bilateral or multifocal.  The cancer in these families follows an autosomal dominant inheritance pattern, which indicates the likely presence of a mutation in a cancer susceptibility gene.  Children and siblings of an individual believed to carry this mutation have a 50% chance of inheriting that mutation, thereby inheriting the increased risk to develop cancer.  These mutations can be passed down from the maternal or the paternal lineage.    Given her family history of medullary thyroid cancer and pheochromocytoma, we discussed the features associated with Multiple Endocrine Neoplasia Type 2 (MEN2).  MEN2 is classified into three subtypes based on the additional features identified within a family. All three subtypes are associated with an increased risk for medullary thyroid cancer.  Individuals with MEN2A have been estimated to have a 95% lifetime risk of MTC with the typical age of onset in early adulthood. MEN2A is also associated with an approximate 50% lifetime risk for pheochromocytomas, which usually present with intractable hypertension. They can be bilateral and have about a 4% risk for malignant transformation. Finally, 20-30% of patients with MEN2A develop parathyroid disease.  Parathyroid abnormalities can range from benign parathyroid  adenomas to clinically evident hyperparathyroidism with hypercalcemia and renal stones. MEN2 is caused by mutations within the RET eli-oncogene.  Genotype/phenotype correlations do exist (i.e. particular mutations within the RET gene are associated with particular risks) and may be used for specific risk assessment.     The standard approach to genetic testing is via a multigene panel.  Genes included on these panels have varying degrees of risk associated, and management and screening guidelines vary based on the specific gene.  Hereditary cancer syndromes can demonstrate incomplete penetrance and variable expression within families. There are genes that are evaluated that have been more recently described, and there may be less data regarding the risks and therefore may not have established management guidelines at this time. We discussed the possibility of results that are unexpected based on family history. Based on Dr. Perez's family history and her desire to get more information regarding her personal risks and risks for her family, she opted to pursue testing through a panel evaluating several other genes known to increase the risk for cancer.     GENETIC TESTING:  The risks, benefits and limitations of genetic testing and implications for clinical management following testing were reviewed. DNA test results can influence decisions regarding screening and prevention.  Genetic testing can have significant psychological implications for both individuals and families. Also discussed was the possibility of employment and insurance discrimination based on genetic test results and the federal and states laws that are in place to prevent this (FABIANO), as well as the limitations of these laws.         We discussed multigene panel testing, which would involve testing several genes associated with an increased cancer risk. The benefits and limitations of genetic testing were discussed and Dr. Perez decided to  pursue testing of the genes via the panel. The implications of a positive or negative test result were discussed.  We also discussed the importance of testing on an affected relative.  In cases where an affected relative is not available for testing or not willing to pursue testing, it is appropriate to offer testing to an unaffected individual. We discussed the possibility that, in some cases, genetic test results may be ambiguous due to the identification of a genetic variant of uncertain significance (VUS). These variants may or may not be associated with an increased cancer risk. With multigene panel testing, it is not uncommon for a VUS to be identified.  If a VUS is identified, testing family members is not recommended and screening recommendations are made based on the family history.  The laboratories that perform genetic testing work to reclassify the VUS and send out an amended report if and when a VUS is reclassified.  The majority of variant findings are ultimately reclassified to a negative result. Given her family history, a negative test result does not eliminate all cancer risk, although the risk would not be as high as it would with positive genetic testing.     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 She can contact me at [phone] with any questions in the meantime.       Paola Whitfield MS, [facility], [facility]  Licensed Certified Genetic Counselor      Total time spent caring for the patient today was 50 minutes. This time includes chart review, time spent during the visit, and time spent after the visit on documentation and follow-up.

## 2025-07-30 ENCOUNTER — TRANSCRIBE ORDERS (OUTPATIENT)
Dept: PHYSICAL THERAPY | Facility: CLINIC | Age: 50
End: 2025-07-30
Payer: COMMERCIAL

## 2025-07-30 DIAGNOSIS — M25.512 ACUTE PAIN OF LEFT SHOULDER: Primary | ICD-10-CM

## 2025-07-31 ENCOUNTER — TREATMENT (OUTPATIENT)
Dept: PHYSICAL THERAPY | Facility: CLINIC | Age: 50
End: 2025-07-31
Payer: COMMERCIAL

## 2025-07-31 DIAGNOSIS — G25.89 SCAPULAR DYSKINESIS: Chronic | ICD-10-CM

## 2025-07-31 DIAGNOSIS — M25.612 DECREASED RANGE OF MOTION OF LEFT SHOULDER: Primary | ICD-10-CM

## 2025-07-31 PROCEDURE — 97535 SELF CARE MNGMENT TRAINING: CPT | Performed by: PHYSICAL THERAPIST

## 2025-07-31 PROCEDURE — 97110 THERAPEUTIC EXERCISES: CPT | Performed by: PHYSICAL THERAPIST

## 2025-07-31 PROCEDURE — 97161 PT EVAL LOW COMPLEX 20 MIN: CPT | Performed by: PHYSICAL THERAPIST

## 2025-08-05 ENCOUNTER — TREATMENT (OUTPATIENT)
Dept: PHYSICAL THERAPY | Facility: CLINIC | Age: 50
End: 2025-08-05
Payer: COMMERCIAL

## 2025-08-05 ENCOUNTER — CLINICAL SUPPORT (OUTPATIENT)
Facility: HOSPITAL | Age: 50
End: 2025-08-05
Payer: COMMERCIAL

## 2025-08-05 ENCOUNTER — LAB (OUTPATIENT)
Facility: HOSPITAL | Age: 50
End: 2025-08-05
Payer: COMMERCIAL

## 2025-08-05 DIAGNOSIS — M25.612 DECREASED RANGE OF MOTION OF LEFT SHOULDER: Primary | ICD-10-CM

## 2025-08-05 DIAGNOSIS — Z13.79 GENETIC TESTING: Primary | ICD-10-CM

## 2025-08-05 DIAGNOSIS — Z84.89 FAMILY HISTORY OF PHEOCHROMOCYTOMA: ICD-10-CM

## 2025-08-05 DIAGNOSIS — Z80.8 FAMILY HISTORY OF THYROID CANCER: ICD-10-CM

## 2025-08-05 DIAGNOSIS — G25.89 SCAPULAR DYSKINESIS: ICD-10-CM

## 2025-08-05 PROCEDURE — DRYNDLTRIAL DRY NEEDLING TRIAL: Performed by: PHYSICAL THERAPIST

## 2025-08-05 PROCEDURE — 97112 NEUROMUSCULAR REEDUCATION: CPT | Performed by: PHYSICAL THERAPIST

## 2025-08-05 PROCEDURE — 97110 THERAPEUTIC EXERCISES: CPT | Performed by: PHYSICAL THERAPIST

## 2025-08-13 ENCOUNTER — TREATMENT (OUTPATIENT)
Dept: PHYSICAL THERAPY | Facility: CLINIC | Age: 50
End: 2025-08-13
Payer: COMMERCIAL

## 2025-08-13 DIAGNOSIS — M25.612 DECREASED RANGE OF MOTION OF LEFT SHOULDER: Primary | ICD-10-CM

## 2025-08-13 DIAGNOSIS — G25.89 SCAPULAR DYSKINESIS: ICD-10-CM

## 2025-08-18 ENCOUNTER — DOCUMENTATION (OUTPATIENT)
Dept: GENETICS | Facility: HOSPITAL | Age: 50
End: 2025-08-18
Payer: COMMERCIAL

## 2025-08-20 ENCOUNTER — TREATMENT (OUTPATIENT)
Dept: PHYSICAL THERAPY | Facility: CLINIC | Age: 50
End: 2025-08-20
Payer: COMMERCIAL

## 2025-08-20 DIAGNOSIS — G25.89 SCAPULAR DYSKINESIS: ICD-10-CM

## 2025-08-20 DIAGNOSIS — M25.612 DECREASED RANGE OF MOTION OF LEFT SHOULDER: Primary | ICD-10-CM

## 2025-08-20 PROCEDURE — 97112 NEUROMUSCULAR REEDUCATION: CPT | Performed by: PHYSICAL THERAPIST

## 2025-08-20 PROCEDURE — 97110 THERAPEUTIC EXERCISES: CPT | Performed by: PHYSICAL THERAPIST

## 2025-08-20 PROCEDURE — 97140 MANUAL THERAPY 1/> REGIONS: CPT | Performed by: PHYSICAL THERAPIST

## 2025-08-28 ENCOUNTER — LAB (OUTPATIENT)
Facility: HOSPITAL | Age: 50
End: 2025-08-28
Payer: COMMERCIAL

## 2025-08-28 ENCOUNTER — TREATMENT (OUTPATIENT)
Dept: PHYSICAL THERAPY | Facility: CLINIC | Age: 50
End: 2025-08-28
Payer: COMMERCIAL

## 2025-08-28 DIAGNOSIS — E06.3 HYPOTHYROIDISM DUE TO HASHIMOTO'S THYROIDITIS: ICD-10-CM

## 2025-08-28 DIAGNOSIS — G25.89 SCAPULAR DYSKINESIS: ICD-10-CM

## 2025-08-28 DIAGNOSIS — M25.612 DECREASED RANGE OF MOTION OF LEFT SHOULDER: Primary | ICD-10-CM

## 2025-08-28 PROCEDURE — 84443 ASSAY THYROID STIM HORMONE: CPT

## 2025-08-28 PROCEDURE — 84480 ASSAY TRIIODOTHYRONINE (T3): CPT

## 2025-08-28 PROCEDURE — 84439 ASSAY OF FREE THYROXINE: CPT

## 2025-08-29 LAB
T3 SERPL-MCNC: 102 NG/DL (ref 80–200)
T4 FREE SERPL-MCNC: 1.37 NG/DL (ref 0.92–1.68)
TSH SERPL DL<=0.05 MIU/L-ACNC: 1.44 UIU/ML (ref 0.27–4.2)